# Patient Record
Sex: MALE | Race: WHITE | Employment: FULL TIME | URBAN - METROPOLITAN AREA
[De-identification: names, ages, dates, MRNs, and addresses within clinical notes are randomized per-mention and may not be internally consistent; named-entity substitution may affect disease eponyms.]

---

## 2017-01-10 ENCOUNTER — DOCUMENTATION ONLY (OUTPATIENT)
Dept: LAB | Facility: CLINIC | Age: 42
End: 2017-01-10

## 2017-01-10 DIAGNOSIS — E78.5 HYPERLIPIDEMIA LDL GOAL <100: Primary | ICD-10-CM

## 2017-01-10 DIAGNOSIS — E11.9 TYPE 2 DIABETES MELLITUS WITHOUT COMPLICATION (H): ICD-10-CM

## 2017-01-10 DIAGNOSIS — E78.5 HYPERLIPIDEMIA LDL GOAL <100: ICD-10-CM

## 2017-01-10 LAB
ANION GAP SERPL CALCULATED.3IONS-SCNC: 6 MMOL/L (ref 3–14)
BUN SERPL-MCNC: 19 MG/DL (ref 7–30)
CALCIUM SERPL-MCNC: 9.7 MG/DL (ref 8.5–10.1)
CHLORIDE SERPL-SCNC: 106 MMOL/L (ref 94–109)
CHOLEST SERPL-MCNC: 139 MG/DL
CO2 SERPL-SCNC: 28 MMOL/L (ref 20–32)
CREAT SERPL-MCNC: 0.95 MG/DL (ref 0.66–1.25)
GFR SERPL CREATININE-BSD FRML MDRD: 87 ML/MIN/1.7M2
GLUCOSE SERPL-MCNC: 138 MG/DL (ref 70–99)
HBA1C MFR BLD: 6.8 % (ref 4.3–6)
HDLC SERPL-MCNC: 41 MG/DL
LDLC SERPL CALC-MCNC: 66 MG/DL
NONHDLC SERPL-MCNC: 98 MG/DL
POTASSIUM SERPL-SCNC: 4.6 MMOL/L (ref 3.4–5.3)
SODIUM SERPL-SCNC: 140 MMOL/L (ref 133–144)
TRIGL SERPL-MCNC: 162 MG/DL

## 2017-01-10 PROCEDURE — 80061 LIPID PANEL: CPT | Performed by: INTERNAL MEDICINE

## 2017-01-10 PROCEDURE — 83036 HEMOGLOBIN GLYCOSYLATED A1C: CPT | Performed by: INTERNAL MEDICINE

## 2017-01-10 PROCEDURE — 36415 COLL VENOUS BLD VENIPUNCTURE: CPT | Performed by: INTERNAL MEDICINE

## 2017-01-10 PROCEDURE — 80048 BASIC METABOLIC PNL TOTAL CA: CPT | Performed by: INTERNAL MEDICINE

## 2017-01-10 NOTE — PROGRESS NOTES
Please place or confirm orders for upcoming lab appointment on 01/10/17. Thank you.    PATIENT WOULD LIKE TO GET BLIPS DONE TODAY AS WELL, DUE TO MEDICATION REFILLS. DOESN'T WANT TO COME BACK, PATIENT IS FASTING. THANK YOU.

## 2017-01-13 ENCOUNTER — MYC MEDICAL ADVICE (OUTPATIENT)
Dept: INTERNAL MEDICINE | Facility: CLINIC | Age: 42
End: 2017-01-13

## 2017-01-18 ENCOUNTER — TELEPHONE (OUTPATIENT)
Dept: INTERNAL MEDICINE | Facility: CLINIC | Age: 42
End: 2017-01-18

## 2017-02-06 ENCOUNTER — MYC MEDICAL ADVICE (OUTPATIENT)
Dept: INTERNAL MEDICINE | Facility: CLINIC | Age: 42
End: 2017-02-06

## 2017-02-06 DIAGNOSIS — F40.243 FEAR OF FLYING: Primary | ICD-10-CM

## 2017-02-07 RX ORDER — ALPRAZOLAM 0.25 MG
TABLET ORAL
Qty: 12 TABLET | Refills: 0
Start: 2017-02-07 | End: 2017-11-03

## 2017-02-07 NOTE — TELEPHONE ENCOUNTER
Sent a message to patient via [employer] informing him that the RX was called over to the pharmacy he requested.

## 2017-04-10 DIAGNOSIS — L30.9 DERMATITIS: ICD-10-CM

## 2017-04-11 RX ORDER — TRIAMCINOLONE ACETONIDE 1 MG/G
CREAM TOPICAL
Qty: 80 G | Refills: 1 | Status: SHIPPED | OUTPATIENT
Start: 2017-04-11 | End: 2017-07-10

## 2017-04-11 NOTE — TELEPHONE ENCOUNTER
triamcinolone (KENALOG) 0.1 % cream      Last Written Prescription Date: 12/02/2016  Last Fill Quantity: 45g,  # refills: 1   Last Office Visit with [facility], [facility] or [facility] prescribing provider: 12/02/2016

## 2017-07-10 ENCOUNTER — OFFICE VISIT (OUTPATIENT)
Dept: INTERNAL MEDICINE | Facility: CLINIC | Age: 42
End: 2017-07-10
Payer: COMMERCIAL

## 2017-07-10 VITALS
WEIGHT: 291.6 LBS | HEIGHT: 72 IN | OXYGEN SATURATION: 96 % | TEMPERATURE: 98 F | HEART RATE: 96 BPM | SYSTOLIC BLOOD PRESSURE: 130 MMHG | BODY MASS INDEX: 39.5 KG/M2 | DIASTOLIC BLOOD PRESSURE: 80 MMHG

## 2017-07-10 DIAGNOSIS — L30.9 DERMATITIS: ICD-10-CM

## 2017-07-10 DIAGNOSIS — F40.243 FEAR OF FLYING: ICD-10-CM

## 2017-07-10 DIAGNOSIS — B07.9 VIRAL WARTS, UNSPECIFIED TYPE: ICD-10-CM

## 2017-07-10 DIAGNOSIS — Z87.898 H/O MOTION SICKNESS: ICD-10-CM

## 2017-07-10 DIAGNOSIS — H10.019: Primary | ICD-10-CM

## 2017-07-10 PROCEDURE — 99214 OFFICE O/P EST MOD 30 MIN: CPT | Performed by: INTERNAL MEDICINE

## 2017-07-10 RX ORDER — POLYMYXIN B SULFATE AND TRIMETHOPRIM 1; 10000 MG/ML; [USP'U]/ML
1 SOLUTION OPHTHALMIC
COMMUNITY
End: 2017-07-10

## 2017-07-10 RX ORDER — TRIAMCINOLONE ACETONIDE 1 MG/G
CREAM TOPICAL EVERY MORNING
Qty: 80 G | Refills: 11 | Status: SHIPPED | OUTPATIENT
Start: 2017-07-10 | End: 2018-12-05

## 2017-07-10 RX ORDER — ALPRAZOLAM 0.5 MG
TABLET ORAL
Qty: 20 TABLET | Refills: 0 | Status: SHIPPED | OUTPATIENT
Start: 2017-07-10 | End: 2018-12-03

## 2017-07-10 RX ORDER — POLYMYXIN B SULFATE AND TRIMETHOPRIM 1; 10000 MG/ML; [USP'U]/ML
1 SOLUTION OPHTHALMIC
Qty: 1 BOTTLE | Refills: 11 | Status: SHIPPED | OUTPATIENT
Start: 2017-07-10 | End: 2018-09-24

## 2017-07-10 RX ORDER — SCOLOPAMINE TRANSDERMAL SYSTEM 1 MG/1
PATCH, EXTENDED RELEASE TRANSDERMAL
Qty: 12 PATCH | Refills: 0 | Status: SHIPPED | OUTPATIENT
Start: 2017-07-10 | End: 2018-12-03

## 2017-07-10 NOTE — MR AVS SNAPSHOT
After Visit Summary   7/10/2017    Jason E Behary    MRN: [id number]           Patient Information     Date Of Birth          1975        Visit Information        Provider Department      7/10/2017 4:30 PM Evangelista Saldana MD [facility]        Today's Diagnoses     Acute follicular conjunctivitis, unspecified laterality    -  1    Dermatitis        Fear of flying        H/O motion sickness        Viral warts, unspecified type           Follow-ups after your visit        Who to contact     If you have questions or need follow up information about today's clinic visit or your schedule please contact [facility] directly at [phone].  Normal or non-critical lab and imaging results will be communicated to you by MyChart, letter or phone within 4 business days after the clinic has received the results. If you do not hear from us within 7 days, please contact the clinic through [employer] or phone. If you have a critical or abnormal lab result, we will notify you by phone as soon as possible.  Submit refill requests through [employer] or call your pharmacy and they will forward the refill request to us. Please allow 3 business days for your refill to be completed.          Additional Information About Your Visit        MyChart Information     [employer] gives you secure access to your electronic health record. If you see a primary care provider, you can also send messages to your care team and make appointments. If you have questions, please call your primary care clinic.  If you do not have a primary care provider, please call [phone] and they will assist you.        Care EveryWhere ID     This is your Care EveryWhere ID. This could be used by other organizations to access your [place] medical records  [id number]        Your Vitals Were     Pulse Temperature Height Pulse Oximetry BMI (Body Mass Index)       96 98  F (36.7  C) (Oral) 6' (1.829 m)  96% 39.55 kg/m2        Blood Pressure from Last 3 Encounters:   07/10/17 130/80   12/02/16 124/76   09/21/16 124/74    Weight from Last 3 Encounters:   07/10/17 291 lb 9.6 oz (132.3 kg)   12/02/16 286 lb 1 oz (129.8 kg)   09/21/16 283 lb (128.4 kg)              Today, you had the following     No orders found for display         Today's Medication Changes          These changes are accurate as of: 7/10/17 11:59 PM.  If you have any questions, ask your nurse or doctor.               Start taking these medicines.        Dose/Directions    scopolamine 72 hr patch   Commonly known as:  TRANSDERM   Used for:  H/O motion sickness   Started by:  Evangelista Saldana MD        Apply 1 patch to hairless area behind one ear at least 4 hours before travel.  Remove old patch and change every 3 days (72 hours).   Quantity:  12 patch   Refills:  0         These medicines have changed or have updated prescriptions.        Dose/Directions    * ALPRAZolam 0.25 MG tablet   Commonly known as:  XANAX   This may have changed:    - how much to take  - additional instructions   Used for:  Fear of flying   Changed by:  Evangelista Saldana MD        Take one tab one hour prior to flying.   Quantity:  12 tablet   Refills:  0       * ALPRAZolam 0.5 MG tablet   Commonly known as:  XANAX   This may have changed:  You were already taking a medication with the same name, and this prescription was added. Make sure you understand how and when to take each.   Used for:  Fear of flying   Changed by:  Evangelista Saldana MD        Daily, 30 minutes prior to travel by plane   Quantity:  20 tablet   Refills:  0       triamcinolone 0.1 % cream   Commonly known as:  KENALOG   This may have changed:  See the new instructions.   Used for:  Dermatitis   Changed by:  Evangelista Saldana MD        Apply topically every morning   Quantity:  80 g   Refills:  11       trimethoprim-polymyxin b ophthalmic solution   Commonly known as:  POLYTRIM   This may have  changed:  how to take this   Used for:  Acute follicular conjunctivitis, unspecified laterality   Changed by:  Evangelista Saldana MD        Dose:  1 drop   Place 1 drop into both eyes every 3 hours   Quantity:  1 Bottle   Refills:  11       * Notice:  This list has 2 medication(s) that are the same as other medications prescribed for you. Read the directions carefully, and ask your doctor or other care provider to review them with you.         Where to get your medicines      These medications were sent to [facility]/pharmacy [address] [place], [place] - [address]  [address], [place] [address]     Phone:  [phone]     scopolamine 72 hr patch    triamcinolone 0.1 % cream    trimethoprim-polymyxin b ophthalmic solution         Some of these will need a paper prescription and others can be bought over the counter.  Ask your nurse if you have questions.     Bring a paper prescription for each of these medications     ALPRAZolam 0.5 MG tablet                Primary Care Provider Office Phone # Fax #    Evangelista Saldana [phone]       [facility] [address] [address]        Equal Access to Services     KELSY MALDONADO AH: Hadii aad ku hadasho Soomaali, waaxda luqadaha, qaybta kaalmada adeegyada, sarah tim hayandre casiano . So [facility] [phone].    ATENCIÓN: Si habla español, tiene a pena disposición servicios gratuitos de asistencia lingüística. [facility] [phone].    We comply with applicable federal civil rights laws and [place] laws. We do not discriminate on the basis of race, color, national origin, age, disability sex, sexual orientation or gender identity.            Thank you!     Thank you for choosing [facility]  for your care. Our goal is always to provide you with excellent care. Hearing back from our patients is one way we can continue to improve our services. Please take a few minutes to complete  the written survey that you may receive in the mail after your visit with us. Thank you!             Your Updated Medication List - Protect others around you: Learn how to safely use, store and throw away your medicines at www.disposemymeds.org.          This list is accurate as of: 7/10/17 11:59 PM.  Always use your most recent med list.                   Brand Name Dispense Instructions for use Diagnosis    * ALPRAZolam 0.25 MG tablet    XANAX    12 tablet    Take one tab one hour prior to flying.    Fear of flying       * ALPRAZolam 0.5 MG tablet    XANAX    20 tablet    Daily, 30 minutes prior to travel by plane    Fear of flying       amLODIPine 10 MG tablet    NORVASC    90 tablet    Take 1 tablet (10 mg) by mouth daily    Essential hypertension, benign       * atorvastatin 10 MG tablet    LIPITOR    90 tablet    Take 1 tablet (10 mg) by mouth daily    Mixed hyperlipidemia       * atorvastatin 20 MG tablet    LIPITOR    90 tablet    Take 1 tablet (20 mg) by mouth daily    Mixed hyperlipidemia       cetirizine 10 MG tablet    zyrTEC    90 tablet    Take 1 tablet (10 mg) by mouth every evening    Allergic rhinitis, cause unspecified       glimepiride 2 MG tablet    AMARYL    90 tablet    Take 1 tablet (2 mg) by mouth every morning (before breakfast)    Type 2 diabetes mellitus without complication, without long-term current use of insulin (H)       hydrochlorothiazide 25 MG tablet    HYDRODIURIL    45 tablet    Take 0.5 tablets (12.5 mg) by mouth daily    Essential hypertension, benign       Hydrocodone-Acetaminophen 5-300 MG Tabs           losartan 100 MG tablet    COZAAR    90 tablet    Take 1 tablet (100 mg) by mouth daily    Essential hypertension, benign       metFORMIN 1000 MG tablet    GLUCOPHAGE    180 tablet    TAKE 1 TABLET (1,000 MG) BY MOUTH 2 TIMES DAILY (WITH MEALS)    Type 2 diabetes mellitus without complication (H)       scopolamine 72 hr patch    TRANSDERM    12 patch    Apply 1 patch to  hairless area behind one ear at least 4 hours before travel.  Remove old patch and change every 3 days (72 hours).    H/O motion sickness       triamcinolone 0.1 % cream    KENALOG    80 g    Apply topically every morning    Dermatitis       trimethoprim-polymyxin b ophthalmic solution    POLYTRIM    1 Bottle    Place 1 drop into both eyes every 3 hours    Acute follicular conjunctivitis, unspecified laterality       * Notice:  This list has 4 medication(s) that are the same as other medications prescribed for you. Read the directions carefully, and ask your doctor or other care provider to review them with you.

## 2017-07-10 NOTE — PROGRESS NOTES
SUBJECTIVE:                                                    Jason E Behary is a 42 year old male who presents to clinic today for the following health issues:      WART(S)      Onset: 2 months    Description (location/number): Left hand, index finger    Accompanying signs and symptoms: Painful: YES    History: prior warts: YES    Therapies tried and outcome: Compound W freeze    Other concerns:  1. Requesting medications: Kenalog cream for facial rash, uses every morning. Scopolamine patch for motion sickness and cruises and Xanax (using 2, 0.25 tablets prior to flying)           Problem list and histories reviewed & adjusted, as indicated.  Additional history: as documented        Reviewed and updated as needed this visit by clinical staff  Tobacco  Allergies  Med Hx  Surg Hx  Fam Hx  Soc Hx      Reviewed and updated as needed this visit by Provider         ROS:  Constitutional, HEENT, cardiovascular, pulmonary, gi and gu systems are negative, except as otherwise noted.      OBJECTIVE:   /80  Pulse 96  Temp 98  F (36.7  C) (Oral)  Ht 6' (1.829 m)  Wt 291 lb 9.6 oz (132.3 kg)  SpO2 96%  BMI 39.55 kg/m2  Body mass index is 39.55 kg/(m^2).  GENERAL: healthy, alert and no distress  NECK: no adenopathy, no asymmetry, masses, or scars and thyroid normal to palpation  RESP: lungs clear to auscultation - no rales, rhonchi or wheezes  CV: regular rate and rhythm, normal S1 S2, no S3 or S4, no murmur, click or rub, no peripheral edema and peripheral pulses strong  ABDOMEN: soft, nontender, no hepatosplenomegaly, no masses and bowel sounds normal  MS: no gross musculoskeletal defects noted, no edema  SKIN: Two very small warts, left lateral index finger        ASSESSMENT/PLAN:     1. Dermatitis    - triamcinolone (KENALOG) 0.1 % cream; Apply topically every morning  Dispense: 80 g; Refill: 11    2. Fear of flying    - ALPRAZolam (XANAX) 0.5 MG tablet; Daily, 30 minutes prior to travel by plane  Dispense:  20 tablet; Refill: 0    3. H/O motion sickness    - scopolamine (TRANSDERM) 72 hr patch; Apply 1 patch to hairless area behind one ear at least 4 hours before travel.  Remove old patch and change every 3 days (72 hours).  Dispense: 12 patch; Refill: 0    4. Acute follicular conjunctivitis, unspecified laterality    - trimethoprim-polymyxin b (POLYTRIM) ophthalmic solution; Place 1 drop into both eyes every 3 hours  Dispense: 1 Bottle; Refill: 11    5. Viral warts, unspecified type  Treated with liquid nitrogen spray      Evangelista Saldana MD  [facility]

## 2017-07-10 NOTE — NURSING NOTE
Chief Complaint   Patient presents with     Medication Request     Wart       Initial /80  Pulse 96  Temp 98  F (36.7  C) (Oral)  Ht 6' (1.829 m)  Wt 291 lb 9.6 oz (132.3 kg)  SpO2 96%  BMI 39.55 kg/m2 Estimated body mass index is 39.55 kg/(m^2) as calculated from the following:    Height as of this encounter: 6' (1.829 m).    Weight as of this encounter: 291 lb 9.6 oz (132.3 kg).  Medication Reconciliation: complete   Rebeca Lynn, CMA

## 2017-09-19 DIAGNOSIS — E11.9 TYPE 2 DIABETES MELLITUS WITHOUT COMPLICATION (H): ICD-10-CM

## 2017-09-19 DIAGNOSIS — I10 ESSENTIAL HYPERTENSION, BENIGN: ICD-10-CM

## 2017-09-19 RX ORDER — LOSARTAN POTASSIUM 100 MG/1
TABLET ORAL
Qty: 90 TABLET | Refills: 0 | Status: SHIPPED | OUTPATIENT
Start: 2017-09-19 | End: 2017-11-03

## 2017-09-19 NOTE — TELEPHONE ENCOUNTER
metformin         Last Written Prescription Date: 09/21/16  Last Fill Quantity: 180, # refills: 3  Last Office Visit with [facility], [facility] or [facility] prescribing provider:  07/10/17        BP Readings from Last 3 Encounters:   07/10/17 130/80   12/02/16 124/76   09/21/16 124/74     No results found for: MICROL  Lab Results   Component Value Date    UMALCR  03/12/2016     Creatinine   Date Value Ref Range Status   01/10/2017 0.95 0.66 - 1.25 mg/dL Final   ]  GFR Estimate   Date Value Ref Range Status   01/10/2017 87 >60 mL/min/1.7m2 Final     Comment:     Non  GFR Calc   09/21/2016 >90  Non  GFR Calc   >60 mL/min/1.7m2 Final   03/12/2016 80 > OR = 60 mL/min/1.73m2 Final     GFR Estimate If Black   Date Value Ref Range Status   01/10/2017 >90   GFR Calc   >60 mL/min/1.7m2 Final   09/21/2016 >90   GFR Calc   >60 mL/min/1.7m2 Final     Lab Results   Component Value Date    CHOL 139 01/10/2017     Lab Results   Component Value Date    HDL 41 01/10/2017     Lab Results   Component Value Date    LDL 66 01/10/2017     Lab Results   Component Value Date    TRIG 162 01/10/2017     Lab Results   Component Value Date    CHOLHDLRATIO 5.0 03/12/2016     Lab Results   Component Value Date    AST 53 03/12/2016     Lab Results   Component Value Date    ALT 66 03/12/2016     Lab Results   Component Value Date    A1C 6.8 01/10/2017    A1C 9.2 09/21/2016    A1C 7.7 03/12/2016    A1C 6.9 09/12/2015    A1C 6.5 02/28/2015     Potassium   Date Value Ref Range Status   01/10/2017 4.6 3.4 - 5.3 mmol/L Final     losartan      Last Written Prescription Date: 09/21/16  Last Fill Quantity: 90, # refills: 3  Last Office Visit with [facility], [facility] or [facility] prescribing provider: 07/10/17       Potassium   Date Value Ref Range Status   01/10/2017 4.6 3.4 - 5.3 mmol/L Final     Creatinine   Date Value Ref Range Status   01/10/2017 0.95 0.66 - 1.25 mg/dL Final     BP Readings from Last 3  Encounters:   07/10/17 130/80   12/02/16 124/76   09/21/16 124/74

## 2017-09-19 NOTE — TELEPHONE ENCOUNTER
Did you want this patient to return this fall again for Diabetes visit or not till January ?Yaneth Lynch RN  Did not see a notation after follow up lab .Yaneth Lynch RN

## 2017-09-21 ENCOUNTER — MYC MEDICAL ADVICE (OUTPATIENT)
Dept: INTERNAL MEDICINE | Facility: CLINIC | Age: 42
End: 2017-09-21

## 2017-09-21 DIAGNOSIS — E11.9 TYPE 2 DIABETES MELLITUS WITHOUT COMPLICATION, WITHOUT LONG-TERM CURRENT USE OF INSULIN (H): Primary | ICD-10-CM

## 2017-09-24 DIAGNOSIS — I10 ESSENTIAL HYPERTENSION, BENIGN: ICD-10-CM

## 2017-09-25 NOTE — TELEPHONE ENCOUNTER
amlodipine     Last Written Prescription Date: 09/21/16  Last Fill Quantity: 90, # refills: 3    Last Office Visit with [facility], [facility] or [facility] prescribing provider:  07/10/17   Future Office Visit:  10/13/17  Next 5 appointments (look out 90 days)     Sep 27, 2017 11:15 AM CDT   [facility]    [address] [id number] [phone]            Oct 13, 2017  4:15 PM CDT   MyChart Long with Evangel[facility]    [address] [id number] [phone]                    BP Readings from Last 3 Encounters:   07/10/17 130/80   12/02/16 124/76   09/21/16 124/74

## 2017-09-26 RX ORDER — AMLODIPINE BESYLATE 10 MG/1
TABLET ORAL
Qty: 90 TABLET | Refills: 2 | Status: SHIPPED | OUTPATIENT
Start: 2017-09-26 | End: 2017-11-03

## 2017-09-27 DIAGNOSIS — E11.9 TYPE 2 DIABETES MELLITUS WITHOUT COMPLICATION, WITHOUT LONG-TERM CURRENT USE OF INSULIN (H): ICD-10-CM

## 2017-09-27 LAB
ANION GAP SERPL CALCULATED.3IONS-SCNC: 9 MMOL/L (ref 3–14)
BUN SERPL-MCNC: 13 MG/DL (ref 7–30)
CALCIUM SERPL-MCNC: 9.5 MG/DL (ref 8.5–10.1)
CHLORIDE SERPL-SCNC: 104 MMOL/L (ref 94–109)
CO2 SERPL-SCNC: 25 MMOL/L (ref 20–32)
CREAT SERPL-MCNC: 0.87 MG/DL (ref 0.66–1.25)
CREAT UR-MCNC: 22 MG/DL
GFR SERPL CREATININE-BSD FRML MDRD: >90 ML/MIN/1.7M2
GLUCOSE SERPL-MCNC: 169 MG/DL (ref 70–99)
HBA1C MFR BLD: 7.7 % (ref 4.3–6)
MICROALBUMIN UR-MCNC: <5 MG/L
MICROALBUMIN/CREAT UR: NORMAL MG/G CR (ref 0–17)
POTASSIUM SERPL-SCNC: 3.6 MMOL/L (ref 3.4–5.3)
SODIUM SERPL-SCNC: 138 MMOL/L (ref 133–144)

## 2017-09-27 PROCEDURE — 82043 UR ALBUMIN QUANTITATIVE: CPT | Performed by: INTERNAL MEDICINE

## 2017-09-27 PROCEDURE — 80048 BASIC METABOLIC PNL TOTAL CA: CPT | Performed by: INTERNAL MEDICINE

## 2017-09-27 PROCEDURE — 83036 HEMOGLOBIN GLYCOSYLATED A1C: CPT | Performed by: INTERNAL MEDICINE

## 2017-09-27 PROCEDURE — 36415 COLL VENOUS BLD VENIPUNCTURE: CPT | Performed by: INTERNAL MEDICINE

## 2017-11-03 ENCOUNTER — OFFICE VISIT (OUTPATIENT)
Dept: INTERNAL MEDICINE | Facility: CLINIC | Age: 42
End: 2017-11-03
Payer: COMMERCIAL

## 2017-11-03 VITALS
BODY MASS INDEX: 40.28 KG/M2 | TEMPERATURE: 97.3 F | OXYGEN SATURATION: 98 % | DIASTOLIC BLOOD PRESSURE: 70 MMHG | HEART RATE: 88 BPM | WEIGHT: 297 LBS | SYSTOLIC BLOOD PRESSURE: 128 MMHG

## 2017-11-03 DIAGNOSIS — I10 ESSENTIAL HYPERTENSION, BENIGN: ICD-10-CM

## 2017-11-03 DIAGNOSIS — E11.9 TYPE 2 DIABETES MELLITUS WITHOUT COMPLICATION, WITHOUT LONG-TERM CURRENT USE OF INSULIN (H): Primary | ICD-10-CM

## 2017-11-03 DIAGNOSIS — E78.2 MIXED HYPERLIPIDEMIA: ICD-10-CM

## 2017-11-03 PROCEDURE — 99213 OFFICE O/P EST LOW 20 MIN: CPT | Performed by: INTERNAL MEDICINE

## 2017-11-03 RX ORDER — ATORVASTATIN CALCIUM 20 MG/1
20 TABLET, FILM COATED ORAL DAILY
Qty: 90 TABLET | Refills: 3 | Status: SHIPPED | OUTPATIENT
Start: 2017-11-03 | End: 2018-11-21

## 2017-11-03 RX ORDER — HYDROCHLOROTHIAZIDE 25 MG/1
12.5 TABLET ORAL DAILY
Qty: 45 TABLET | Refills: 3 | Status: SHIPPED | OUTPATIENT
Start: 2017-11-03 | End: 2018-10-23

## 2017-11-03 RX ORDER — LOSARTAN POTASSIUM 100 MG/1
100 TABLET ORAL DAILY
Qty: 90 TABLET | Refills: 3 | Status: SHIPPED | OUTPATIENT
Start: 2017-11-03 | End: 2018-12-03

## 2017-11-03 RX ORDER — AMLODIPINE BESYLATE 10 MG/1
10 TABLET ORAL DAILY
Qty: 90 TABLET | Refills: 3 | Status: SHIPPED | OUTPATIENT
Start: 2017-11-03 | End: 2018-12-03

## 2017-11-03 NOTE — LETTER
[facility]  [address] [address]  Tel. [phone]  Fax [phone]      November 3, 2017     To whom it may concern:    I am writing this letter on behalf of my patient, Jason E Behary (: 1975).  He was seen in my clinic today in follow-up.    This patient has a history of low back pain with occasional lower extremity radiculopathy, exacerbated by prolonged sitting.  I recommend that he be provided a high-seated chair for use at his desk.     Please contact my office if you have questions or concerns.    Sincerely,        DANIELA Saldana  Department of Internal Medicine  [facility]

## 2017-11-03 NOTE — NURSING NOTE
Chief Complaint   Patient presents with     Diabetes     Hyperlipidemia     Hypertension       Initial /70  Pulse 88  Temp 97.3  F (36.3  C) (Oral)  Wt 297 lb (134.7 kg)  SpO2 98%  BMI 40.28 kg/m2 Estimated body mass index is 40.28 kg/(m^2) as calculated from the following:    Height as of 7/10/17: 6' (1.829 m).    Weight as of this encounter: 297 lb (134.7 kg).  Medication Reconciliation: complete

## 2017-11-03 NOTE — MR AVS SNAPSHOT
After Visit Summary   11/3/2017    Jason E Behary    MRN: [id number]           Patient Information     Date Of Birth          1975        Visit Information        Provider Department      11/3/2017 4:15 PM Evangelista Saldana MD [facility]        Today's Diagnoses     Type 2 diabetes mellitus without complication, without long-term current use of insulin (H)    -  1    Essential hypertension, benign        Mixed hyperlipidemia           Follow-ups after your visit        Who to contact     If you have questions or need follow up information about today's clinic visit or your schedule please contact [facility] directly at [phone].  Normal or non-critical lab and imaging results will be communicated to you by MyChart, letter or phone within 4 business days after the clinic has received the results. If you do not hear from us within 7 days, please contact the clinic through [employer] or phone. If you have a critical or abnormal lab result, we will notify you by phone as soon as possible.  Submit refill requests through [employer] or call your pharmacy and they will forward the refill request to us. Please allow 3 business days for your refill to be completed.          Additional Information About Your Visit        MyChart Information     [employer] gives you secure access to your electronic health record. If you see a primary care provider, you can also send messages to your care team and make appointments. If you have questions, please call your primary care clinic.  If you do not have a primary care provider, please call [phone] and they will assist you.        Care EveryWhere ID     This is your Care EveryWhere ID. This could be used by other organizations to access your [place] medical records  [id number]        Your Vitals Were     Pulse Temperature Pulse Oximetry BMI (Body Mass Index)          88 97.3  F (36.3  C) (Oral) 98% 40.28 kg/m2          Blood Pressure from Last 3 Encounters:   11/03/17 128/70   07/10/17 130/80   12/02/16 124/76    Weight from Last 3 Encounters:   11/03/17 297 lb (134.7 kg)   07/10/17 291 lb 9.6 oz (132.3 kg)   12/02/16 286 lb 1 oz (129.8 kg)              Today, you had the following     No orders found for display         Today's Medication Changes          These changes are accurate as of: 11/3/17 11:59 PM.  If you have any questions, ask your nurse or doctor.               These medicines have changed or have updated prescriptions.        Dose/Directions    amLODIPine 10 MG tablet   Commonly known as:  NORVASC   This may have changed:  See the new instructions.   Used for:  Essential hypertension, benign   Changed by:  Evangelista Saldana MD        Dose:  10 mg   Take 1 tablet (10 mg) by mouth daily   Quantity:  90 tablet   Refills:  3       losartan 100 MG tablet   Commonly known as:  COZAAR   This may have changed:  See the new instructions.   Used for:  Essential hypertension, benign   Changed by:  Evangelista Saldana MD        Dose:  100 mg   Take 1 tablet (100 mg) by mouth daily   Quantity:  90 tablet   Refills:  3       metFORMIN 1000 MG tablet   Commonly known as:  GLUCOPHAGE   This may have changed:  See the new instructions.   Used for:  Type 2 diabetes mellitus without complication, without long-term current use of insulin (H)   Changed by:  Evangelista Saldana MD        Dose:  1000 mg   Take 1 tablet (1,000 mg) by mouth 2 times daily (with meals)   Quantity:  180 tablet   Refills:  3            Where to get your medicines      These medications were sent to [place] [address] [place] [address] [address]     Phone:  [phone]     amLODIPine 10 MG tablet    atorvastatin 20 MG tablet    hydrochlorothiazide 25 MG tablet    losartan 100 MG tablet    metFORMIN 1000 MG tablet                Primary Care Provider Office Phone # Fax #    Evangelista Lauren  MD [place] [phone]       [address] [place] [address]        Equal Access to Services     THOMAS MALDONADO : Hadii aad ku hadbrysonjosafat Frances, blakeda richidelmyha, kylahta kajuan darriuseder, sarah danein hayaamarilyn rizoshai shelton stephenie brar. So [facility] [phone].    ATENCIÓN: Si habla español, tiene a pena disposición servicios gratuitos de asistencia lingüística. Llame al [phone].    We comply with applicable federal civil rights laws and [place] laws. We do not discriminate on the basis of race, color, national origin, age, disability, sex, sexual orientation, or gender identity.            Thank you!     Thank you for choosing [facility]  for your care. Our goal is always to provide you with excellent care. Hearing back from our patients is one way we can continue to improve our services. Please take a few minutes to complete the written survey that you may receive in the mail after your visit with us. Thank you!             Your Updated Medication List - Protect others around you: Learn how to safely use, store and throw away your medicines at www.disposemymeds.org.          This list is accurate as of: 11/3/17 11:59 PM.  Always use your most recent med list.                   Brand Name Dispense Instructions for use Diagnosis    ALPRAZolam 0.5 MG tablet    XANAX    20 tablet    Daily, 30 minutes prior to travel by plane    Fear of flying       amLODIPine 10 MG tablet    NORVASC    90 tablet    Take 1 tablet (10 mg) by mouth daily    Essential hypertension, benign       atorvastatin 20 MG tablet    LIPITOR    90 tablet    Take 1 tablet (20 mg) by mouth daily    Mixed hyperlipidemia       cetirizine 10 MG tablet    zyrTEC    90 tablet    Take 1 tablet (10 mg) by mouth every evening    Allergic rhinitis, cause unspecified       glimepiride 2 MG tablet    AMARYL    90 tablet    Take 1 tablet (2 mg) by mouth every morning (before breakfast)    Type 2 diabetes mellitus without  complication, without long-term current use of insulin (H)       hydrochlorothiazide 25 MG tablet    HYDRODIURIL    45 tablet    Take 0.5 tablets (12.5 mg) by mouth daily    Essential hypertension, benign       losartan 100 MG tablet    COZAAR    90 tablet    Take 1 tablet (100 mg) by mouth daily    Essential hypertension, benign       metFORMIN 1000 MG tablet    GLUCOPHAGE    180 tablet    Take 1 tablet (1,000 mg) by mouth 2 times daily (with meals)    Type 2 diabetes mellitus without complication, without long-term current use of insulin (H)       scopolamine 72 hr patch    TRANSDERM    12 patch    Apply 1 patch to hairless area behind one ear at least 4 hours before travel.  Remove old patch and change every 3 days (72 hours).    H/O motion sickness       triamcinolone 0.1 % cream    KENALOG    80 g    Apply topically every morning    Dermatitis       trimethoprim-polymyxin b ophthalmic solution    POLYTRIM    1 Bottle    Place 1 drop into both eyes every 3 hours    Acute follicular conjunctivitis, unspecified laterality

## 2017-11-03 NOTE — PROGRESS NOTES
SUBJECTIVE:   Jason E Behary is a 42 year old male who presents to clinic today for the following health issues:      Diabetes Follow-up      Patient is checking blood sugars: not at all    Diabetic concerns: None     Symptoms of hypoglycemia (low blood sugar): none     Paresthesias (numbness or burning in feet) or sores: No     Date of last diabetic eye exam: 10/2016    Hyperlipidemia Follow-Up      Rate your low fat/cholesterol diet?: fair    Taking statin? Yes, no muscle pain    Other lipid medications/supplements?:  none    Hypertension Follow-up      Outpatient blood pressures are not being checked.    Low Salt Diet: no added salt        Amount of exercise or physical activity: 2-3 days/week for an average of 30-45 minutes    Problems taking medications regularly: No    Medication side effects: none    Diet: low salt and low fat/cholesterol            Problem list and histories reviewed & adjusted, as indicated.  Additional history:         Reviewed and updated as needed this visit by clinical staffTobacco  Allergies       Reviewed and updated as needed this visit by Provider         ROS:  Constitutional, HEENT, cardiovascular, pulmonary, GI, , musculoskeletal, neuro, skin, endocrine and psych systems are negative, except as otherwise noted.      OBJECTIVE:   /70  Pulse 88  Temp 97.3  F (36.3  C) (Oral)  Wt 297 lb (134.7 kg)  SpO2 98%  BMI 40.28 kg/m2  Body mass index is 40.28 kg/(m^2).  GENERAL: healthy, alert and no distress  NECK: no adenopathy, no asymmetry, masses, or scars and thyroid normal to palpation  RESP: lungs clear to auscultation - no rales, rhonchi or wheezes  CV: regular rate and rhythm, normal S1 S2, no S3 or S4, no murmur, click or rub, no peripheral edema and peripheral pulses strong  ABDOMEN: soft, nontender, no hepatosplenomegaly, no masses and bowel sounds normal  MS: no gross musculoskeletal defects noted, no edema        ASSESSMENT/PLAN:     1. Essential hypertension,  benign  Stable  - losartan (COZAAR) 100 MG tablet; Take 1 tablet (100 mg) by mouth daily  Dispense: 90 tablet; Refill: 3  - amLODIPine (NORVASC) 10 MG tablet; Take 1 tablet (10 mg) by mouth daily  Dispense: 90 tablet; Refill: 3  - hydrochlorothiazide (HYDRODIURIL) 25 MG tablet; Take 0.5 tablets (12.5 mg) by mouth daily  Dispense: 45 tablet; Refill: 3    2. Mixed hyperlipidemia  Stable  - atorvastatin (LIPITOR) 20 MG tablet; Take 1 tablet (20 mg) by mouth daily  Dispense: 90 tablet; Refill: 3    3. Type 2 diabetes mellitus without complication, without long-term current use of insulin (H)  Stable  - metFORMIN (GLUCOPHAGE) 1000 MG tablet; Take 1 tablet (1,000 mg) by mouth 2 times daily (with meals)  Dispense: 180 tablet; Refill: 3        Evangelista Saldana MD  [facility]

## 2017-11-08 DIAGNOSIS — E78.2 MIXED HYPERLIPIDEMIA: ICD-10-CM

## 2017-11-08 RX ORDER — ATORVASTATIN CALCIUM 20 MG/1
TABLET, FILM COATED ORAL
Qty: 90 TABLET | Refills: 3 | Status: SHIPPED | OUTPATIENT
Start: 2017-11-08 | End: 2018-04-12

## 2017-11-15 ENCOUNTER — TRANSFERRED RECORDS (OUTPATIENT)
Dept: HEALTH INFORMATION MANAGEMENT | Facility: CLINIC | Age: 42
End: 2017-11-15

## 2017-11-19 DIAGNOSIS — I10 ESSENTIAL HYPERTENSION, BENIGN: ICD-10-CM

## 2017-11-21 RX ORDER — HYDROCHLOROTHIAZIDE 25 MG/1
TABLET ORAL
Qty: 45 TABLET | Refills: 2 | OUTPATIENT
Start: 2017-11-21

## 2017-12-31 ENCOUNTER — MYC MEDICAL ADVICE (OUTPATIENT)
Dept: INTERNAL MEDICINE | Facility: CLINIC | Age: 42
End: 2017-12-31

## 2017-12-31 DIAGNOSIS — E11.9 TYPE 2 DIABETES MELLITUS WITHOUT COMPLICATION, WITHOUT LONG-TERM CURRENT USE OF INSULIN (H): ICD-10-CM

## 2018-01-02 RX ORDER — GLIMEPIRIDE 2 MG/1
2 TABLET ORAL
Qty: 90 TABLET | Refills: 3 | Status: CANCELLED | OUTPATIENT
Start: 2018-01-02

## 2018-01-02 RX ORDER — GLIMEPIRIDE 2 MG/1
TABLET ORAL
Qty: 90 TABLET | Refills: 0 | Status: SHIPPED | OUTPATIENT
Start: 2018-01-02 | End: 2018-01-02

## 2018-01-02 RX ORDER — GLIMEPIRIDE 2 MG/1
TABLET ORAL
Qty: 90 TABLET | Refills: 0 | Status: SHIPPED | OUTPATIENT
Start: 2018-01-02 | End: 2018-08-13

## 2018-02-20 ENCOUNTER — OFFICE VISIT (OUTPATIENT)
Dept: DERMATOLOGY | Facility: CLINIC | Age: 43
End: 2018-02-20
Payer: COMMERCIAL

## 2018-02-20 VITALS — SYSTOLIC BLOOD PRESSURE: 153 MMHG | HEART RATE: 93 BPM | DIASTOLIC BLOOD PRESSURE: 91 MMHG | OXYGEN SATURATION: 98 %

## 2018-02-20 DIAGNOSIS — L21.9 SEBORRHEIC DERMATITIS: ICD-10-CM

## 2018-02-20 DIAGNOSIS — L70.8 STEROID-INDUCED ACNE: ICD-10-CM

## 2018-02-20 DIAGNOSIS — L82.0 INFLAMED SEBORRHEIC KERATOSIS: Primary | ICD-10-CM

## 2018-02-20 DIAGNOSIS — T38.0X5A STEROID-INDUCED ACNE: ICD-10-CM

## 2018-02-20 PROCEDURE — 99203 OFFICE O/P NEW LOW 30 MIN: CPT | Mod: 25 | Performed by: PHYSICIAN ASSISTANT

## 2018-02-20 PROCEDURE — 17110 DESTRUCTION B9 LES UP TO 14: CPT | Performed by: PHYSICIAN ASSISTANT

## 2018-02-20 RX ORDER — CLINDAMYCIN PHOSPHATE 10 UG/ML
LOTION TOPICAL
Qty: 60 ML | Refills: 2 | Status: SHIPPED | OUTPATIENT
Start: 2018-02-20 | End: 2018-12-05

## 2018-02-20 RX ORDER — KETOCONAZOLE 20 MG/G
CREAM TOPICAL
Qty: 60 G | Refills: 5 | Status: SHIPPED | OUTPATIENT
Start: 2018-02-20 | End: 2018-12-05

## 2018-02-20 NOTE — PATIENT INSTRUCTIONS
Apply clindamycin lotion to eye area twice daily for 1-2 weeks     Apply ketoconazole cream daily as needed for flaky skin     WOUND CARE INSTRUCTIONS   FOR CRYOSURGERY   This area treated with liquid nitrogen will form a blister. You do not need to bandage the area until after the blister forms and breaks (which may be a few days). When the blister breaks, begin daily dressing changes as follows:   1) Clean and dry the area with tap water using clean Q-tip or sterile gauze pad.   2) Apply Polysporin ointment or Bacitracin ointment over entire wound. Do NOT use Neosporin ointment.   3) Cover the wound with a band-aid or sterile non-stick gauze pad and micropore paper tape.   REPEAT THESE INSTRUCTIONS AT LEAST ONCE A DAY UNTIL THE WOUND HAS COMPLETELY HEALED.   It is an old wives tale that a wound heals better when it is exposed to air and allowed to dry out. The wound will heal faster with a better cosmetic result if it is kept moist with ointment and covered with a bandage.   Do not let the wound dry out.   IMPORTANT INFORMATION ON REVERSE SIDE   Supplies Needed:   *Cotton tipped applicators (Q-tips)   *Polysporin ointment or Bacitracin ointment (NOT NEOSPORIN)   *Band-aids, or non stick gauze pads and micropore paper tape   PATIENT INFORMATION   During the healing process you will notice a number of changes. All wounds develop a small halo of redness surrounding the wound. This means healing is occurring. Severe itching with extensive redness usually indicates sensitivity to the ointment or bandage tape used to dress the wound. You should call our office if this develops.   Swelling and/or discoloration around your surgical site is common, particularly when performed around the eye.   All wounds normally drain. The larger the wound the more drainage there will be. After 7-10 days, you will notice the wound beginning to shrink and new skin will begin to grow. The wound is healed when you can see skin has formed  over the entire area. A healed wound has a healthy, shiny look to the surface and is red to dark pink in color to normalize. Wounds may take approximately 4-6 weeks to heal. Larger wounds may take 6-8 weeks. After the wound is healed you may discontinue dressing changes.   You may experience a sensation of tightness as your wound heals. This is normal and will gradually subside.   Your healed wound may be sensitive to temperature changes. This sensitivity improves with time, but if you re having a lot of discomfort, try to avoid temperature extremes.   Patients frequently experience itching after their wound appears to have healed because of the continue healing under the skin. Plain Vaseline will help relieve the itching.

## 2018-02-20 NOTE — PROGRESS NOTES
HPI:   Jason E Behary is a 42 year old male who presents for evaluation of multiple bumps on face  chief complaint  Location: near right eye; below left lower lip; left cheek    Condition present for:  Bumps near right eye - 3 months, developed after sunburn while on vacation; below left lower lip and left cheek - 2 months   Previous treatments include: daily TAC, moisturizer, rubbing alcohol; eye drops for stye    Review Of Systems  Eyes: negative  Ears/Nose/Throat: negative  Respiratory: No shortness of breath, dyspnea on exertion, cough, or hemoptysis  Cardiovascular: negative  Gastrointestinal: negative  Genitourinary: negative  Musculoskeletal: negative  Neurologic: negative  Psychiatric: negative    This document serves as a record of the services and decisions personally performed and made by Irina Boss, MS, PA-C. It was created on her behalf by Earline Maki, a trained medical scribe. The creation of this document is based on the provider's statements to the medical scribe.  Earline Maki 4:25 PM February 20, 2018    PHYSICAL EXAM:      Skin exam performed as follows: Type 2 skin. Mood appropriate  Alert and Oriented X 3. Well developed, well nourished in no distress.  General appearance: Normal  Head including face: Normal  Eyes: conjunctiva and lids: Normal  Mouth: Lips, teeth, gums: Normal  Neck: Normal  Chest-breast/axillae: Normal  Back: Normal  Spleen and liver: Normal  Cardiovascular: Exam of peripheral vascular system by observation for swelling, varicosities, edema: Normal  Genitalia: groin, buttocks: Normal  Extremities: digits/nails (clubbing): Normal  Eccrine and Apocrine glands: Normal  Right upper extremity: Normal  Left upper extremity: Normal  Right lower extremity: Normal  Left lower extremity: Normal  Skin: Scalp and body hair: See below    1. Inflamed keratotic plaques on left cheek x 3  2. Papular eruption around right lateral eye    ASSESSMENT/PLAN:     1. Inflamed  seborrheic keratosis on left cheek x 3. As physically tender cryosurgery performed. Advised on post op care.    Using TAC for presumed Seborrheic dermatitis - clear today. He uses this every AM and has used it for the past 7 months. If he does not use it, he becomes very flaky and dry. advised on chronic, recurrent condition. Discussed that it is a reaction to the normal yeast on the skin.    --Start ketoconazole cream daily as needed   --Can resume TAC if flaring - discussed to use this sparingly for 1-2 weeks at a time only  3.   ? Steroid induced acne vs dermatitis on right lateral eye, not responsive to TAC - advised of diagnosis. He has been using TAC daily for 7 months. Does use an eyedrop occasionally for stye's in the eye but has not used this in over 1 month  --Start clindamycin lotion BID for 1-2 weeks, then PRN   --D/c TAC   --Consider PO prednisone if struggling        Follow-up: PRN  CC:   Scribed By: Earline Maki Medical Scribe    The information in this document, created by the medical scribe for me, accurately reflects the services I personally performed and the decisions made by me. I have reviewed and approved this document for accuracy prior to leaving the patient care area.  February 20, 2018 4:39 PM    Irina Boss MS, PA-C

## 2018-02-20 NOTE — MR AVS SNAPSHOT
After Visit Summary   2/20/2018    Jason E Behary    MRN: [id number]           Patient Information     Date Of Birth          1975        Visit Information        Provider Department      2/20/2018 4:15 PM Irina Boss PA-C [facility]        Today's Diagnoses     Inflamed seborrheic keratosis    -  1    Seborrheic dermatitis        Steroid-induced acne          Care Instructions    Apply clindamycin lotion to eye area twice daily for 1-2 weeks     Apply ketoconazole cream daily as needed for flaky skin     WOUND CARE INSTRUCTIONS   FOR CRYOSURGERY   This area treated with liquid nitrogen will form a blister. You do not need to bandage the area until after the blister forms and breaks (which may be a few days). When the blister breaks, begin daily dressing changes as follows:   1) Clean and dry the area with tap water using clean Q-tip or sterile gauze pad.   2) Apply Polysporin ointment or Bacitracin ointment over entire wound. Do NOT use Neosporin ointment.   3) Cover the wound with a band-aid or sterile non-stick gauze pad and micropore paper tape.   REPEAT THESE INSTRUCTIONS AT LEAST ONCE A DAY UNTIL THE WOUND HAS COMPLETELY HEALED.   It is an old wives tale that a wound heals better when it is exposed to air and allowed to dry out. The wound will heal faster with a better cosmetic result if it is kept moist with ointment and covered with a bandage.   Do not let the wound dry out.   IMPORTANT INFORMATION ON REVERSE SIDE   Supplies Needed:   *Cotton tipped applicators (Q-tips)   *Polysporin ointment or Bacitracin ointment (NOT NEOSPORIN)   *Band-aids, or non stick gauze pads and micropore paper tape   PATIENT INFORMATION   During the healing process you will notice a number of changes. All wounds develop a small halo of redness surrounding the wound. This means healing is occurring. Severe itching with extensive redness usually indicates sensitivity to the ointment  or bandage tape used to dress the wound. You should call our office if this develops.   Swelling and/or discoloration around your surgical site is common, particularly when performed around the eye.   All wounds normally drain. The larger the wound the more drainage there will be. After 7-10 days, you will notice the wound beginning to shrink and new skin will begin to grow. The wound is healed when you can see skin has formed over the entire area. A healed wound has a healthy, shiny look to the surface and is red to dark pink in color to normalize. Wounds may take approximately 4-6 weeks to heal. Larger wounds may take 6-8 weeks. After the wound is healed you may discontinue dressing changes.   You may experience a sensation of tightness as your wound heals. This is normal and will gradually subside.   Your healed wound may be sensitive to temperature changes. This sensitivity improves with time, but if you re having a lot of discomfort, try to avoid temperature extremes.   Patients frequently experience itching after their wound appears to have healed because of the continue healing under the skin. Plain Vaseline will help relieve the itching.                 Follow-ups after your visit        Who to contact     If you have questions or need follow up information about today's clinic visit or your schedule please contact [facility] directly at [phone].  Normal or non-critical lab and imaging results will be communicated to you by [employer], letter or phone within 4 business days after the clinic has received the results. If you do not hear from us within 7 days, please contact the clinic through [employer] or phone. If you have a critical or abnormal lab result, we will notify you by phone as soon as possible.  Submit refill requests through [employer] or call your pharmacy and they will forward the refill request to us. Please allow 3 business days for your refill to be completed.           Additional Information About Your Visit        MyChart Information     [employer] gives you secure access to your electronic health record. If you see a primary care provider, you can also send messages to your care team and make appointments. If you have questions, please call your primary care clinic.  If you do not have a primary care provider, please call [phone] and they will assist you.        Care EveryWhere ID     This is your Care EveryWhere ID. This could be used by other organizations to access your [place] medical records  [id number]        Your Vitals Were     Pulse Pulse Oximetry                93 98%           Blood Pressure from Last 3 Encounters:   02/20/18 (!) 153/91   11/03/17 128/70   07/10/17 130/80    Weight from Last 3 Encounters:   11/03/17 134.7 kg (297 lb)   07/10/17 132.3 kg (291 lb 9.6 oz)   12/02/16 129.8 kg (286 lb 1 oz)              Today, you had the following     No orders found for display       Primary Care Provider Office Phone # Fax #    Evangelista Saldana [phone]       [address] [address]        Equal Access to Services     KELSY MALDONADO AH: Hadii aad ku hadasho Soomaali, waaxda luqadaha, qaybta kaalmada adeegyada, waxay danein hayjohnn adeshai shelton labrie ah. So [facility] [phone].    ATENCIÓN: Si habla español, tiene a pena disposición servicios gratuitos de asistencia lingüística. Marinaame al [phone].    We comply with applicable federal civil rights laws and [place] laws. We do not discriminate on the basis of race, color, national origin, age, disability, sex, sexual orientation, or gender identity.            Thank you!     Thank you for choosing [facility]  for your care. Our goal is always to provide you with excellent care. Hearing back from our patients is one way we can continue to improve our services. Please take a few minutes to complete the written survey that you may receive in the mail after your  visit with us. Thank you!             Your Updated Medication List - Protect others around you: Learn how to safely use, store and throw away your medicines at www.disposemymeds.org.          This list is accurate as of 2/20/18  4:39 PM.  Always use your most recent med list.                   Brand Name Dispense Instructions for use Diagnosis    ALPRAZolam 0.5 MG tablet    XANAX    20 tablet    Daily, 30 minutes prior to travel by plane    Fear of flying       amLODIPine 10 MG tablet    NORVASC    90 tablet    Take 1 tablet (10 mg) by mouth daily    Essential hypertension, benign       * atorvastatin 20 MG tablet    LIPITOR    90 tablet    Take 1 tablet (20 mg) by mouth daily    Mixed hyperlipidemia       * atorvastatin 20 MG tablet    LIPITOR    90 tablet    TAKE 1 TABLET (20 MG) BY MOUTH DAILY. PT WILL CALL WHEN NEEDS    Mixed hyperlipidemia       cetirizine 10 MG tablet    zyrTEC    90 tablet    Take 1 tablet (10 mg) by mouth every evening    Allergic rhinitis, cause unspecified       glimepiride 2 MG tablet    AMARYL    90 tablet    TAKE 1 TABLET (2 MG) BY MOUTH EVERY MORNING (BEFORE BREAKFAST)    Type 2 diabetes mellitus without complication, without long-term current use of insulin (H)       hydrochlorothiazide 25 MG tablet    HYDRODIURIL    45 tablet    Take 0.5 tablets (12.5 mg) by mouth daily    Essential hypertension, benign       losartan 100 MG tablet    COZAAR    90 tablet    Take 1 tablet (100 mg) by mouth daily    Essential hypertension, benign       metFORMIN 1000 MG tablet    GLUCOPHAGE    180 tablet    Take 1 tablet (1,000 mg) by mouth 2 times daily (with meals)    Type 2 diabetes mellitus without complication, without long-term current use of insulin (H)       scopolamine 72 hr patch    TRANSDERM    12 patch    Apply 1 patch to hairless area behind one ear at least 4 hours before travel.  Remove old patch and change every 3 days (72 hours).    H/O motion sickness       triamcinolone 0.1 % cream     KENALOG    80 g    Apply topically every morning    Dermatitis       trimethoprim-polymyxin b ophthalmic solution    POLYTRIM    1 Bottle    Place 1 drop into both eyes every 3 hours    Acute follicular conjunctivitis, unspecified laterality       * Notice:  This list has 2 medication(s) that are the same as other medications prescribed for you. Read the directions carefully, and ask your doctor or other care provider to review them with you.

## 2018-02-20 NOTE — NURSING NOTE
Chief Complaint   Patient presents with     Skin Check     right ey,  bump underneath left lower lip, bump left cheek        Initial BP (!) 153/91  Pulse 93  SpO2 98% Estimated body mass index is 40.28 kg/(m^2) as calculated from the following:    Height as of 7/10/17: 1.829 m (6').    Weight as of 11/3/17: 134.7 kg (297 lb).  Medication Reconciliation: complete

## 2018-02-20 NOTE — LETTER
2/20/2018         RE: Jason E Behary  [address] [facility] [id number]        Dear Colleague,    Thank you for referring your patient, Jason E Behary, to the [facility]. Please see a copy of my visit note below.    HPI:   Jason E Behary is a 42 year old male who presents for evaluation of multiple bumps on face  chief complaint  Location: near right eye; below left lower lip; left cheek    Condition present for:  Bumps near right eye - 3 months, developed after sunburn while on vacation; below left lower lip and left cheek - 2 months   Previous treatments include: daily TAC, moisturizer, rubbing alcohol; eye drops for stye    Review Of Systems  Eyes: negative  Ears/Nose/Throat: negative  Respiratory: No shortness of breath, dyspnea on exertion, cough, or hemoptysis  Cardiovascular: negative  Gastrointestinal: negative  Genitourinary: negative  Musculoskeletal: negative  Neurologic: negative  Psychiatric: negative    This document serves as a record of the services and decisions personally performed and made by Irina Boss, MS, PA-C. It was created on her behalf by Earline Maki, a trained medical scribe. The creation of this document is based on the provider's statements to the medical scribe.  Earline Maki 4:25 PM February 20, 2018    PHYSICAL EXAM:      Skin exam performed as follows: Type 2 skin. Mood appropriate  Alert and Oriented X 3. Well developed, well nourished in no distress.  General appearance: Normal  Head including face: Normal  Eyes: conjunctiva and lids: Normal  Mouth: Lips, teeth, gums: Normal  Neck: Normal  Chest-breast/axillae: Normal  Back: Normal  Spleen and liver: Normal  Cardiovascular: Exam of peripheral vascular system by observation for swelling, varicosities, edema: Normal  Genitalia: groin, buttocks: Normal  Extremities: digits/nails (clubbing): Normal  Eccrine and Apocrine glands: Normal  Right upper extremity: Normal  Left upper  extremity: Normal  Right lower extremity: Normal  Left lower extremity: Normal  Skin: Scalp and body hair: See below    1. Inflamed keratotic plaques on left cheek x 3  2. Papular eruption around right lateral eye    ASSESSMENT/PLAN:     1. Inflamed seborrheic keratosis on left cheek x 3. As physically tender cryosurgery performed. Advised on post op care.    Using TAC for presumed Seborrheic dermatitis - clear today. He uses this every AM and has used it for the past 7 months. If he does not use it, he becomes very flaky and dry. advised on chronic, recurrent condition. Discussed that it is a reaction to the normal yeast on the skin.    --Start ketoconazole cream daily as needed   --Can resume TAC if flaring - discussed to use this sparingly for 1-2 weeks at a time only  3.   ? Steroid induced acne vs dermatitis on right lateral eye, not responsive to TAC - advised of diagnosis. He has been using TAC daily for 7 months. Does use an eyedrop occasionally for stye's in the eye but has not used this in over 1 month  --Start clindamycin lotion BID for 1-2 weeks, then PRN   --D/c TAC   --Consider PO prednisone if struggling        Follow-up: PRN  CC:   Scribed By: Earline Maki Medical Scribe    The information in this document, created by the medical scribe for me, accurately reflects the services I personally performed and the decisions made by me. I have reviewed and approved this document for accuracy prior to leaving the patient care area.  February 20, 2018 4:39 PM    Irina Boss MS, PAARMEN      Again, thank you for allowing me to participate in the care of your patient.        Sincerely,        Irina Boss PA-C

## 2018-03-28 ENCOUNTER — MYC MEDICAL ADVICE (OUTPATIENT)
Dept: INTERNAL MEDICINE | Facility: CLINIC | Age: 43
End: 2018-03-28

## 2018-03-28 DIAGNOSIS — E11.9 TYPE 2 DIABETES MELLITUS WITHOUT COMPLICATION, WITHOUT LONG-TERM CURRENT USE OF INSULIN (H): Primary | ICD-10-CM

## 2018-04-03 DIAGNOSIS — E11.9 TYPE 2 DIABETES MELLITUS WITHOUT COMPLICATION, WITHOUT LONG-TERM CURRENT USE OF INSULIN (H): ICD-10-CM

## 2018-04-03 LAB
ALBUMIN SERPL-MCNC: 4.2 G/DL (ref 3.4–5)
ALP SERPL-CCNC: 82 U/L (ref 40–150)
ALT SERPL W P-5'-P-CCNC: 80 U/L (ref 0–70)
ANION GAP SERPL CALCULATED.3IONS-SCNC: 7 MMOL/L (ref 3–14)
AST SERPL W P-5'-P-CCNC: 76 U/L (ref 0–45)
BILIRUB SERPL-MCNC: 0.5 MG/DL (ref 0.2–1.3)
BUN SERPL-MCNC: 15 MG/DL (ref 7–30)
CALCIUM SERPL-MCNC: 9.2 MG/DL (ref 8.5–10.1)
CHLORIDE SERPL-SCNC: 105 MMOL/L (ref 94–109)
CHOLEST SERPL-MCNC: 140 MG/DL
CO2 SERPL-SCNC: 27 MMOL/L (ref 20–32)
CREAT SERPL-MCNC: 0.88 MG/DL (ref 0.66–1.25)
GFR SERPL CREATININE-BSD FRML MDRD: >90 ML/MIN/1.7M2
GLUCOSE SERPL-MCNC: 268 MG/DL (ref 70–99)
HBA1C MFR BLD: 8.3 % (ref 0–6.4)
HDLC SERPL-MCNC: 26 MG/DL
LDLC SERPL CALC-MCNC: 65 MG/DL
NONHDLC SERPL-MCNC: 114 MG/DL
POTASSIUM SERPL-SCNC: 4.4 MMOL/L (ref 3.4–5.3)
PROT SERPL-MCNC: 7.4 G/DL (ref 6.8–8.8)
SODIUM SERPL-SCNC: 139 MMOL/L (ref 133–144)
TRIGL SERPL-MCNC: 246 MG/DL

## 2018-04-03 PROCEDURE — 83036 HEMOGLOBIN GLYCOSYLATED A1C: CPT | Performed by: INTERNAL MEDICINE

## 2018-04-03 PROCEDURE — 80053 COMPREHEN METABOLIC PANEL: CPT | Performed by: INTERNAL MEDICINE

## 2018-04-03 PROCEDURE — 80061 LIPID PANEL: CPT | Performed by: INTERNAL MEDICINE

## 2018-04-03 PROCEDURE — 36415 COLL VENOUS BLD VENIPUNCTURE: CPT | Performed by: INTERNAL MEDICINE

## 2018-04-12 ENCOUNTER — OFFICE VISIT (OUTPATIENT)
Dept: INTERNAL MEDICINE | Facility: CLINIC | Age: 43
End: 2018-04-12
Payer: COMMERCIAL

## 2018-04-12 VITALS
DIASTOLIC BLOOD PRESSURE: 76 MMHG | TEMPERATURE: 98.2 F | SYSTOLIC BLOOD PRESSURE: 134 MMHG | BODY MASS INDEX: 40.5 KG/M2 | HEIGHT: 72 IN | WEIGHT: 299 LBS | HEART RATE: 97 BPM | OXYGEN SATURATION: 96 %

## 2018-04-12 DIAGNOSIS — L82.0 INFLAMED SEBORRHEIC KERATOSIS: ICD-10-CM

## 2018-04-12 DIAGNOSIS — E11.9 TYPE 2 DIABETES MELLITUS WITHOUT COMPLICATION, WITHOUT LONG-TERM CURRENT USE OF INSULIN (H): Primary | ICD-10-CM

## 2018-04-12 PROCEDURE — 99214 OFFICE O/P EST MOD 30 MIN: CPT | Performed by: INTERNAL MEDICINE

## 2018-04-12 ASSESSMENT — PAIN SCALES - GENERAL: PAINLEVEL: MILD PAIN (2)

## 2018-04-12 NOTE — PROGRESS NOTES
SUBJECTIVE:   Jason E Behary is a 42 year old male who presents to clinic today for the following health issues:      Diabetes Follow-up      Patient is checking blood sugars: not at all    Diabetic concerns: None and blood sugar frequently over 200     Symptoms of hypoglycemia (low blood sugar): none     Paresthesias (numbness or burning in feet) or sores: No     Date of last diabetic eye exam: 10/2017    BP Readings from Last 2 Encounters:   02/20/18 (!) 153/91   11/03/17 128/70     Hemoglobin A1C (%)   Date Value   04/03/2018 8.3 (H)   09/27/2017 7.7 (H)     LDL Cholesterol Calculated (mg/dL)   Date Value   04/03/2018 65   01/10/2017 66       Amount of exercise or physical activity: 2-3 days/week for an average of 45-60 minutes    Problems taking medications regularly: No    Medication side effects: none    Diet: low salt      Bump on face      Duration: 12/2017    Description (location/character/radiation): 1/4 inch in diameter on the Left cheek    Intensity:  mild    Accompanying signs and symptoms: Painful to touch    History (similar episodes/previous evaluation): None    Precipitating or alleviating factors: According to Dermatologist it is age related    Therapies tried and outcome: Liquid Nitrogen and cream ineffective   Hoping to get it to removed today.    Patient also has form he needs completed        Problem list and histories reviewed & adjusted, as indicated.  Additional history:     Glycosylated hemoglobin up to 8.3.  Patient interested in not increasing medicines if possible, feels there is still room to improve his therapeutic lifestyle changes.    The bump on his left cheek is a seborrheic keratosis, which has been frozen multiple times by dermatology.  He is requesting this be done again today.    Reviewed and updated as needed this visit by clinical staff       Reviewed and updated as needed this visit by Provider         ROS:  Constitutional, HEENT, cardiovascular, pulmonary, GI, ,  musculoskeletal, neuro, skin, endocrine and psych systems are negative, except as otherwise noted.    OBJECTIVE:     /76 (BP Location: Left arm, Patient Position: Chair, Cuff Size: Adult Large)  Pulse 97  Temp 98.2  F (36.8  C) (Oral)  Ht 6' (1.829 m)  Wt 299 lb (135.6 kg)  SpO2 96%  BMI 40.55 kg/m2  Body mass index is 40.55 kg/(m^2).  GENERAL: healthy, alert and no distress  NECK: no adenopathy, no asymmetry, masses, or scars and thyroid normal to palpation  RESP: lungs clear to auscultation - no rales, rhonchi or wheezes  CV: regular rate and rhythm, normal S1 S2, no S3 or S4, no murmur, click or rub, no peripheral edema and peripheral pulses strong  ABDOMEN: soft, nontender, no hepatosplenomegaly, no masses and bowel sounds normal  MS: no gross musculoskeletal defects noted, no edema  SKIN: Seborrheic keratosis, left cheek        ASSESSMENT/PLAN:     1. Type 2 diabetes mellitus without complication, without long-term current use of insulin (H)  Suboptimally controlled.  Again encouraged therapeutic lifestyle changes, will continue metformin and glimepiride for now, recheck in 6 months.  - Comprehensive metabolic panel; Future  - Hemoglobin A1c; Future  - TSH with free T4 reflex; Future  - Albumin Random Urine Quantitative with Creat Ratio; Future    2. Inflamed seborrheic keratosis  Treated with cryotherapy, return to clinic as needed.  Suggest next follow-up should be with Derm for definitive therapy.          Evangelista Saldana MD  [facility]

## 2018-04-12 NOTE — MR AVS SNAPSHOT
After Visit Summary   4/12/2018    Jason E Behary    MRN: [id number]           Patient Information     Date Of Birth          1975        Visit Information        Provider Department      4/12/2018 11:15 AM Evangelista Saldana MD [facility]        Today's Diagnoses     Type 2 diabetes mellitus without complication, without long-term current use of insulin (H)    -  1    Inflamed seborrheic keratosis           Follow-ups after your visit        Future tests that were ordered for you today     Open Future Orders        Priority Expected Expires Ordered    Comprehensive metabolic panel Routine 10/9/2018 11/8/2018 4/12/2018    Hemoglobin A1c Routine 10/9/2018 11/8/2018 4/12/2018    TSH with free T4 reflex Routine 10/9/2018 11/8/2018 4/12/2018    Albumin Random Urine Quantitative with Creat Ratio Routine 10/9/2018 11/8/2018 4/12/2018            Who to contact     If you have questions or need follow up information about today's clinic visit or your schedule please contact [facility] directly at [phone].  Normal or non-critical lab and imaging results will be communicated to you by Fits.mehart, letter or phone within 4 business days after the clinic has received the results. If you do not hear from us within 7 days, please contact the clinic through ShopSquad/Ownzat or phone. If you have a critical or abnormal lab result, we will notify you by phone as soon as possible.  Submit refill requests through HouseTrip or call your pharmacy and they will forward the refill request to us. Please allow 3 business days for your refill to be completed.          Additional Information About Your Visit        Fits.mehart Information     HouseTrip gives you secure access to your electronic health record. If you see a primary care provider, you can also send messages to your care team and make appointments. If you have questions, please call your primary care clinic.  If you do not  have a primary care provider, please call [phone] and they will assist you.        Care EveryWhere ID     This is your Care EveryWhere ID. This could be used by other organizations to access your [place] medical records  [id number]        Your Vitals Were     Pulse Temperature Height Pulse Oximetry BMI (Body Mass Index)       97 98.2  F (36.8  C) (Oral) 6' (1.829 m) 96% 40.55 kg/m2        Blood Pressure from Last 3 Encounters:   04/12/18 134/76   02/20/18 (!) 153/91   11/03/17 128/70    Weight from Last 3 Encounters:   04/12/18 299 lb (135.6 kg)   11/03/17 297 lb (134.7 kg)   07/10/17 291 lb 9.6 oz (132.3 kg)               Primary Care Provider Office Phone # Fax #    Evangelista Saldana [phone]       [address] [address]        Equal Access to Services     THOMAS MALDONADO : Hadii aad ku hadasho Soomaali, waaxda luqadaha, qaybta kaalmada adeegyada, waxay idiin hayjohnn rey casiano . So [facility] [phone].    ATENCIÓN: Si habla español, tiene a pena disposición servicios gratuitos de asistencia lingüística. [facility] [phone].    We comply with applicable federal civil rights laws and [place] laws. We do not discriminate on the basis of race, color, national origin, age, disability, sex, sexual orientation, or gender identity.            Thank you!     Thank you for choosing [facility]  for your care. Our goal is always to provide you with excellent care. Hearing back from our patients is one way we can continue to improve our services. Please take a few minutes to complete the written survey that you may receive in the mail after your visit with us. Thank you!             Your Updated Medication List - Protect others around you: Learn how to safely use, store and throw away your medicines at www.disposemymeds.org.          This list is accurate as of 4/12/18  2:26 PM.  Always use your most recent med list.                   Brand Name  Dispense Instructions for use Diagnosis    ALPRAZolam 0.5 MG tablet    XANAX    20 tablet    Daily, 30 minutes prior to travel by plane    Fear of flying       amLODIPine 10 MG tablet    NORVASC    90 tablet    Take 1 tablet (10 mg) by mouth daily    Essential hypertension, benign       atorvastatin 20 MG tablet    LIPITOR    90 tablet    Take 1 tablet (20 mg) by mouth daily    Mixed hyperlipidemia       cetirizine 10 MG tablet    zyrTEC    90 tablet    Take 1 tablet (10 mg) by mouth every evening    Allergic rhinitis, cause unspecified       clindamycin 1 % lotion    CLEOCIN T    60 mL    Apply to AA BID    Steroid-induced acne       glimepiride 2 MG tablet    AMARYL    90 tablet    TAKE 1 TABLET (2 MG) BY MOUTH EVERY MORNING (BEFORE BREAKFAST)    Type 2 diabetes mellitus without complication, without long-term current use of insulin (H)       hydrochlorothiazide 25 MG tablet    HYDRODIURIL    45 tablet    Take 0.5 tablets (12.5 mg) by mouth daily    Essential hypertension, benign       ketoconazole 2 % cream    NIZORAL    60 g    Apply to AA QD PRN    Seborrheic dermatitis       losartan 100 MG tablet    COZAAR    90 tablet    Take 1 tablet (100 mg) by mouth daily    Essential hypertension, benign       metFORMIN 1000 MG tablet    GLUCOPHAGE    180 tablet    Take 1 tablet (1,000 mg) by mouth 2 times daily (with meals)    Type 2 diabetes mellitus without complication, without long-term current use of insulin (H)       scopolamine 72 hr patch    TRANSDERM    12 patch    Apply 1 patch to hairless area behind one ear at least 4 hours before travel.  Remove old patch and change every 3 days (72 hours).    H/O motion sickness       triamcinolone 0.1 % cream    KENALOG    80 g    Apply topically every morning    Dermatitis       trimethoprim-polymyxin b ophthalmic solution    POLYTRIM    1 Bottle    Place 1 drop into both eyes every 3 hours    Acute follicular conjunctivitis, unspecified laterality

## 2018-04-13 ENCOUNTER — TELEPHONE (OUTPATIENT)
Dept: INTERNAL MEDICINE | Facility: CLINIC | Age: 43
End: 2018-04-13

## 2018-04-13 NOTE — TELEPHONE ENCOUNTER
Reason for call:  Form   Our goal is to have forms completed within 72 hours, however some forms may require a visit or additional information.     Who is the form from? Mazin Russo (if other please explain)  Where did the form come from? Patient or family brought in     What clinic location was the form placed at? [facility]  Where was the form placed? Given to PCP  What number is listed as a contact on the form?     Phone call message - patient request for a letter, form or note:     Date needed: as soon as possible  Please fax to [phone]  Has the patient signed a consent form for release of information? YES    Additional comments:     Type of letter, form or note: [employer]    Phone number to reach patient:  Cell number on file:    Telephone Information:   Mobile [phone]       Best Time:      Can we leave a detailed message on this number?  YES

## 2018-05-15 ENCOUNTER — OFFICE VISIT (OUTPATIENT)
Dept: DERMATOLOGY | Facility: CLINIC | Age: 43
End: 2018-05-15
Payer: COMMERCIAL

## 2018-05-15 VITALS — DIASTOLIC BLOOD PRESSURE: 79 MMHG | HEART RATE: 84 BPM | SYSTOLIC BLOOD PRESSURE: 131 MMHG | OXYGEN SATURATION: 98 %

## 2018-05-15 DIAGNOSIS — H01.9 DERMATITIS OF EYELIDS OF BOTH EYES: ICD-10-CM

## 2018-05-15 DIAGNOSIS — L82.0 INFLAMED SEBORRHEIC KERATOSIS: Primary | ICD-10-CM

## 2018-05-15 PROCEDURE — 17110 DESTRUCTION B9 LES UP TO 14: CPT | Performed by: PHYSICIAN ASSISTANT

## 2018-05-15 PROCEDURE — 99213 OFFICE O/P EST LOW 20 MIN: CPT | Mod: 25 | Performed by: PHYSICIAN ASSISTANT

## 2018-05-15 RX ORDER — TACROLIMUS 1 MG/G
OINTMENT TOPICAL 2 TIMES DAILY
Qty: 60 G | Refills: 3 | Status: SHIPPED | OUTPATIENT
Start: 2018-05-15 | End: 2018-12-05

## 2018-05-15 NOTE — MR AVS SNAPSHOT
After Visit Summary   5/15/2018    Jason E Behary    MRN: [id number]           Patient Information     Date Of Birth          1975        Visit Information        Provider Department      5/15/2018 4:30 PM Irina Boss PA-C [facility]        Today's Diagnoses     Inflamed seborrheic keratosis    -  1    Dermatitis of eyelids of both eyes          Care Instructions    WOUND CARE INSTRUCTIONS   FOR CRYOSURGERY   This area treated with liquid nitrogen will form a blister. You do not need to bandage the area until after the blister forms and breaks (which may be a few days). When the blister breaks, begin daily dressing changes as follows:   1) Clean and dry the area with tap water using clean Q-tip or sterile gauze pad.   2) Apply Polysporin ointment or Bacitracin ointment over entire wound. Do NOT use Neosporin ointment.   3) Cover the wound with a band-aid or sterile non-stick gauze pad and micropore paper tape.   REPEAT THESE INSTRUCTIONS AT LEAST ONCE A DAY UNTIL THE WOUND HAS COMPLETELY HEALED.   It is an old wives tale that a wound heals better when it is exposed to air and allowed to dry out. The wound will heal faster with a better cosmetic result if it is kept moist with ointment and covered with a bandage.   Do not let the wound dry out.   IMPORTANT INFORMATION ON REVERSE SIDE   Supplies Needed:   *Cotton tipped applicators (Q-tips)   *Polysporin ointment or Bacitracin ointment (NOT NEOSPORIN)   *Band-aids, or non stick gauze pads and micropore paper tape   PATIENT INFORMATION   During the healing process you will notice a number of changes. All wounds develop a small halo of redness surrounding the wound. This means healing is occurring. Severe itching with extensive redness usually indicates sensitivity to the ointment or bandage tape used to dress the wound. You should call our office if this develops.   Swelling and/or discoloration around your surgical  site is common, particularly when performed around the eye.   All wounds normally drain. The larger the wound the more drainage there will be. After 7-10 days, you will notice the wound beginning to shrink and new skin will begin to grow. The wound is healed when you can see skin has formed over the entire area. A healed wound has a healthy, shiny look to the surface and is red to dark pink in color to normalize. Wounds may take approximately 4-6 weeks to heal. Larger wounds may take 6-8 weeks. After the wound is healed you may discontinue dressing changes.   You may experience a sensation of tightness as your wound heals. This is normal and will gradually subside.   Your healed wound may be sensitive to temperature changes. This sensitivity improves with time, but if you re having a lot of discomfort, try to avoid temperature extremes.   Patients frequently experience itching after their wound appears to have healed because of the continue healing under the skin. Plain Vaseline will help relieve the itching.                 Follow-ups after your visit        Your next 10 appointments already scheduled     Jun 12, 2018  4:30 PM CDT   Return Visit with Irina Boss PA-C   [facility]    [address] [place] [address] [phone]              Who to contact     If you have questions or need follow up information about today's clinic visit or your schedule please contact [facility] directly at [phone].  Normal or non-critical lab and imaging results will be communicated to you by MyChart, letter or phone within 4 business days after the clinic has received the results. If you do not hear from us within 7 days, please contact the clinic through MyChart or phone. If you have a critical or abnormal lab result, we will notify you by phone as soon as possible.  Submit refill requests through InStream Mediat or call  your pharmacy and they will forward the refill request to us. Please allow 3 business days for your refill to be completed.          Additional Information About Your Visit        MyChart Information     Sun Numberhart gives you secure access to your electronic health record. If you see a primary care provider, you can also send messages to your care team and make appointments. If you have questions, please call your primary care clinic.  If you do not have a primary care provider, please call [phone] and they will assist you.        Care EveryWhere ID     This is your Care EveryWhere ID. This could be used by other organizations to access your [place] medical records  [id number]        Your Vitals Were     Pulse Pulse Oximetry                84 98%           Blood Pressure from Last 3 Encounters:   05/15/18 131/79   04/12/18 134/76   02/20/18 (!) 153/91    Weight from Last 3 Encounters:   04/12/18 135.6 kg (299 lb)   11/03/17 134.7 kg (297 lb)   07/10/17 132.3 kg (291 lb 9.6 oz)              We Performed the Following     DESTRUCT BENIGN LESION, UP TO 14          Today's Medication Changes          These changes are accurate as of 5/15/18  6:20 PM.  If you have any questions, ask your nurse or doctor.               Start taking these medicines.        Dose/Directions    tacrolimus 0.1 % ointment   Commonly known as:  PROTOPIC   Used for:  Dermatitis of eyelids of both eyes   Started by:  Irina Boss PA-C        Apply topically 2 times daily   Quantity:  60 g   Refills:  3            Where to get your medicines      These medications were sent to [place] [address] [place] [address] [address]     Phone:  [phone]     tacrolimus 0.1 % ointment                Primary Care Provider Office Phone # Fax #    Evangelista Saldana [phone]       [address] [place] [id number]        Equal Access to Services     THOMAS MALDONADO  AH: Hadii shahzad noebrysono Sogrzegorzali, waaxda luqadaha, qaybta kaalmada adeeder, sarah danein hayaamarilyn rizoshai shelton laHarjeetaamarilyn brar. So [facility] [phone].    ATENCIÓN: Si habla coleen, tiene a pena disposición servicios gratuitos de asistencia lingüística. Llame al [phone].    We comply with applicable federal civil rights laws and [place] laws. We do not discriminate on the basis of race, color, national origin, age, disability, sex, sexual orientation, or gender identity.            Thank you!     Thank you for choosing [facility]  for your care. Our goal is always to provide you with excellent care. Hearing back from our patients is one way we can continue to improve our services. Please take a few minutes to complete the written survey that you may receive in the mail after your visit with us. Thank you!             Your Updated Medication List - Protect others around you: Learn how to safely use, store and throw away your medicines at www.disposemymeds.org.          This list is accurate as of 5/15/18  6:20 PM.  Always use your most recent med list.                   Brand Name Dispense Instructions for use Diagnosis    ALPRAZolam 0.5 MG tablet    XANAX    20 tablet    Daily, 30 minutes prior to travel by plane    Fear of flying       amLODIPine 10 MG tablet    NORVASC    90 tablet    Take 1 tablet (10 mg) by mouth daily    Essential hypertension, benign       atorvastatin 20 MG tablet    LIPITOR    90 tablet    Take 1 tablet (20 mg) by mouth daily    Mixed hyperlipidemia       cetirizine 10 MG tablet    zyrTEC    90 tablet    Take 1 tablet (10 mg) by mouth every evening    Allergic rhinitis, cause unspecified       clindamycin 1 % lotion    CLEOCIN T    60 mL    Apply to AA BID    Steroid-induced acne       glimepiride 2 MG tablet    AMARYL    90 tablet    TAKE 1 TABLET (2 MG) BY MOUTH EVERY MORNING (BEFORE BREAKFAST)    Type 2 diabetes mellitus without complication, without long-term  current use of insulin (H)       hydrochlorothiazide 25 MG tablet    HYDRODIURIL    45 tablet    Take 0.5 tablets (12.5 mg) by mouth daily    Essential hypertension, benign       ketoconazole 2 % cream    NIZORAL    60 g    Apply to AA QD PRN    Seborrheic dermatitis       losartan 100 MG tablet    COZAAR    90 tablet    Take 1 tablet (100 mg) by mouth daily    Essential hypertension, benign       metFORMIN 1000 MG tablet    GLUCOPHAGE    180 tablet    Take 1 tablet (1,000 mg) by mouth 2 times daily (with meals)    Type 2 diabetes mellitus without complication, without long-term current use of insulin (H)       scopolamine 72 hr patch    TRANSDERM    12 patch    Apply 1 patch to hairless area behind one ear at least 4 hours before travel.  Remove old patch and change every 3 days (72 hours).    H/O motion sickness       tacrolimus 0.1 % ointment    PROTOPIC    60 g    Apply topically 2 times daily    Dermatitis of eyelids of both eyes       triamcinolone 0.1 % cream    KENALOG    80 g    Apply topically every morning    Dermatitis       trimethoprim-polymyxin b ophthalmic solution    POLYTRIM    1 Bottle    Place 1 drop into both eyes every 3 hours    Acute follicular conjunctivitis, unspecified laterality

## 2018-05-15 NOTE — LETTER
5/15/2018         RE: Jason E Behary  [address] [facility] [address]        Dear Colleague,    Thank you for referring your patient, Jason E Behary, to the [facility]. Please see a copy of my visit note below.    HPI:   Jason E Behary is a 42 year old male who presents for follow up of multiple bumps on face  chief complaint  Location: near right eye; below left lower lip; left cheek    Condition present for:  Bumps near right eye - 5 months, developed after sunburn while on vacation; below left lower lip and left cheek - 2 months   Previous treatments include: daily TAC, moisturizer, rubbing alcohol; eye drops for stye,ketoconazole    Review Of Systems  Eyes: negative  Ears/Nose/Throat: negative  Respiratory: No shortness of breath, dyspnea on exertion, cough, or hemoptysis  Cardiovascular: negative  Gastrointestinal: negative  Genitourinary: negative  Musculoskeletal: negative  Neurologic: negative  Psychiatric: negative    This document serves as a record of the services and decisions personally performed and made by Irina Boss, MS, PA-C. It was created on her behalf by Shilpi Streeter, a trained medical scribe. The creation of this document is based on the provider's statements to the medical scribe.  Shilpi Streeter 4:37 PM May 15, 2018    PHYSICAL EXAM:    /79  Pulse 84  SpO2 98%  Skin exam performed as follows: Type 2 skin. Mood appropriate  Alert and Oriented X 3. Well developed, well nourished in no distress.  General appearance: Normal  Head including face: Normal  Eyes: conjunctiva and lids: Normal  Mouth: Lips, teeth, gums: Normal  Neck: Normal  Chest-breast/axillae: Normal  Back: Normal  Spleen and liver: Normal  Cardiovascular: Exam of peripheral vascular system by observation for swelling, varicosities, edema: Normal  Genitalia: groin, buttocks: Normal  Extremities: digits/nails (clubbing): Normal  Eccrine and Apocrine glands:  Normal  Right upper extremity: Normal  Left upper extremity: Normal  Right lower extremity: Normal  Left lower extremity: Normal  Skin: Scalp and body hair: See below    1. Inflamed keratotic papules on left cheek x 3    ASSESSMENT/PLAN:     1. Inflamed seborrheic keratosis on left cheek x 3 (2nd treatment will biopsy at next OV if not responsive). As physically tender cryosurgery performed. Advised on post op care.    Using TAC on the face presumed Seborrheic dermatitis. He uses this every AM and has used it for the past 7 months. Still using this; clindamycin lotion did not help. If he does not use it, he becomes very flaky and dry. advised on chronic, recurrent condition. More suspect eyelid dermatitis. Advised to d/c steroid; discussed risks of skin atrophy and increased IOP with steroids.    --Start tacrolimus ointment BID PRN              Follow-up: 1 month  CC:   Scribed By:Shilpi Streeter, Medical Scribe    The information in this document, created by the medical scribe for me, accurately reflects the services I personally performed and the decisions made by me. I have reviewed and approved this document for accuracy prior to leaving the patient care area.  May 15, 2018 4:50 PM    Irina Boss MS, PA-C      Again, thank you for allowing me to participate in the care of your patient.        Sincerely,        Irina Boss PA-C

## 2018-05-15 NOTE — PROGRESS NOTES
HPI:   Jason E Behary is a 42 year old male who presents for follow up of multiple bumps on face  chief complaint  Location: near right eye; below left lower lip; left cheek    Condition present for:  Bumps near right eye - 5 months, developed after sunburn while on vacation; below left lower lip and left cheek - 2 months   Previous treatments include: daily TAC, moisturizer, rubbing alcohol; eye drops for stye,ketoconazole    Review Of Systems  Eyes: negative  Ears/Nose/Throat: negative  Respiratory: No shortness of breath, dyspnea on exertion, cough, or hemoptysis  Cardiovascular: negative  Gastrointestinal: negative  Genitourinary: negative  Musculoskeletal: negative  Neurologic: negative  Psychiatric: negative    This document serves as a record of the services and decisions personally performed and made by Irina Boss, MS, PA-C. It was created on her behalf by Shilpi Streeter, a trained medical scribe. The creation of this document is based on the provider's statements to the medical scribe.  Shilpi Streeter 4:37 PM May 15, 2018    PHYSICAL EXAM:    /79  Pulse 84  SpO2 98%  Skin exam performed as follows: Type 2 skin. Mood appropriate  Alert and Oriented X 3. Well developed, well nourished in no distress.  General appearance: Normal  Head including face: Normal  Eyes: conjunctiva and lids: Normal  Mouth: Lips, teeth, gums: Normal  Neck: Normal  Chest-breast/axillae: Normal  Back: Normal  Spleen and liver: Normal  Cardiovascular: Exam of peripheral vascular system by observation for swelling, varicosities, edema: Normal  Genitalia: groin, buttocks: Normal  Extremities: digits/nails (clubbing): Normal  Eccrine and Apocrine glands: Normal  Right upper extremity: Normal  Left upper extremity: Normal  Right lower extremity: Normal  Left lower extremity: Normal  Skin: Scalp and body hair: See below    1. Inflamed keratotic papules on left cheek x 3    ASSESSMENT/PLAN:     1. Inflamed  seborrheic keratosis on left cheek x 3 (2nd treatment will biopsy at next OV if not responsive). As physically tender cryosurgery performed. Advised on post op care.    Using TAC on the face presumed Seborrheic dermatitis. He uses this every AM and has used it for the past 7 months. Still using this; clindamycin lotion did not help. If he does not use it, he becomes very flaky and dry. advised on chronic, recurrent condition. More suspect eyelid dermatitis. Advised to d/c steroid; discussed risks of skin atrophy and increased IOP with steroids.    --Start tacrolimus ointment BID PRN              Follow-up: 1 month  CC:   Scribed By:Shilpi Streeter, Medical Scribe    The information in this document, created by the medical scribe for me, accurately reflects the services I personally performed and the decisions made by me. I have reviewed and approved this document for accuracy prior to leaving the patient care area.  May 15, 2018 4:50 PM    Irina Boss MS, PA-C

## 2018-05-29 ENCOUNTER — MYC MEDICAL ADVICE (OUTPATIENT)
Dept: DERMATOLOGY | Facility: CLINIC | Age: 43
End: 2018-05-29

## 2018-05-29 DIAGNOSIS — H01.9 DERMATITIS OF EYELIDS OF BOTH EYES: Primary | ICD-10-CM

## 2018-05-29 NOTE — TELEPHONE ENCOUNTER
Called and LM for patient to call back in regards to symptoms and providers notes.  Sara RN-BSN  [facility]  [phone]

## 2018-05-30 RX ORDER — PREDNISONE 10 MG/1
TABLET ORAL
Qty: 30 TABLET | Refills: 0 | Status: SHIPPED | OUTPATIENT
Start: 2018-05-30 | End: 2018-12-05

## 2018-05-30 NOTE — TELEPHONE ENCOUNTER
Called and spoke with patient. Patient would like to move forward for the PO prednisone, pharmacy has been selected. Patient voiced understanding.   Should patient continue using cream or stop?    Sara RN-BSN  [facility]  [phone]

## 2018-05-30 NOTE — TELEPHONE ENCOUNTER
Called and LM for patient to call back in regards to providers notes and orders.  Sara RN-BSN  [facility]  [phone]

## 2018-05-30 NOTE — TELEPHONE ENCOUNTER
Patient called back. Educated patient on providers notes and orders. Patient voiced understanding.  Sara RN-BSN  [facility]  [phone]

## 2018-06-12 ENCOUNTER — OFFICE VISIT (OUTPATIENT)
Dept: DERMATOLOGY | Facility: CLINIC | Age: 43
End: 2018-06-12
Payer: COMMERCIAL

## 2018-06-12 VITALS — SYSTOLIC BLOOD PRESSURE: 129 MMHG | DIASTOLIC BLOOD PRESSURE: 83 MMHG | OXYGEN SATURATION: 97 % | HEART RATE: 74 BPM

## 2018-06-12 DIAGNOSIS — H01.9 DERMATITIS OF EYELIDS OF BOTH EYES: Primary | ICD-10-CM

## 2018-06-12 PROCEDURE — 99213 OFFICE O/P EST LOW 20 MIN: CPT | Performed by: PHYSICIAN ASSISTANT

## 2018-06-12 RX ORDER — DESONIDE 0.5 MG/G
CREAM TOPICAL
Qty: 60 G | Refills: 2 | Status: SHIPPED | OUTPATIENT
Start: 2018-06-12 | End: 2018-12-05

## 2018-06-12 NOTE — PATIENT INSTRUCTIONS
Vanicream free and clear shampoo exclusively for one month. Can find it at [facility].      Discontinue shampoo and Julia face wash    Follow up in 1 month

## 2018-06-12 NOTE — LETTER
6/12/2018         RE: Jason E Behary  [address] [facility] [address]        Dear Colleague,    Thank you for referring your patient, Jason E Behary, to the [facility]. Please see a copy of my visit note below.    HPI:   Jason E Behary is a 43 year old male who presents for recheck of facial rash. Tacrolimus burns and he became very agitated on prednisone.   chief complaint  Location: around eyes, upper eyelids    Condition present for:  Initially had flaking and irritation on forehead 2 years ago and has been using TAC cream daily. 5 months ago developed persistent red bumps by the right eyelid which do not resolve with TAC. Now have spread to the left  Previous treatments include: daily TAC, moisturizer, rubbing alcohol; eye drops for stye,ketoconazole, clindamycin gel, tacrolimus, prednisone    Review Of Systems  Eyes: negative  Ears/Nose/Throat: negative  Respiratory: No shortness of breath, dyspnea on exertion, cough, or hemoptysis  Cardiovascular: negative  Gastrointestinal: negative  Genitourinary: negative  Musculoskeletal: negative  Neurologic: negative  Psychiatric: negative    This document serves as a record of the services and decisions personally performed and made by Irina Boss, MS, PA-C. It was created on her behalf by Shilpi Streeter, a trained medical scribe. The creation of this document is based on the provider's statements to the medical scribe.  Shilpi Streeter 4:32 PM June 12, 2018    PHYSICAL EXAM:    /83  Pulse 74  SpO2 97%  Skin exam performed as follows: Type 2 skin. Mood appropriate  Alert and Oriented X 3. Well developed, well nourished in no distress.  General appearance: Normal  Head including face: Normal  Eyes: conjunctiva and lids: Normal  Mouth: Lips, teeth, gums: Normal  Neck: Normal  Chest-breast/axillae: Normal  Back: Normal  Spleen and liver: Normal  Cardiovascular: Exam of peripheral vascular system by  observation for swelling, varicosities, edema: Normal  Genitalia: groin, buttocks: Normal  Extremities: digits/nails (clubbing): Normal  Eccrine and Apocrine glands: Normal  Right upper extremity: Normal  Left upper extremity: Normal  Right lower extremity: Normal  Left lower extremity: Normal  Skin: Scalp and body hair: See below    1. Flaking and erythema on bilateral upper eyelids; inflammatory papules at corners of eyes    ASSESSMENT/PLAN:      1.  Eyelid dermatitis - advised on condition. Discussed difficulty in determining an exact etiology, but that the most common culprits are shampoos and nail polish. Discussed gentle skin care at length; discussed OTC products that are free of allergens.   Initially, he was using TAC on the face presumed Seborrheic dermatitis. He uses this every AM and has used it for the past 2 years. Still using this; clindamycin lotion did not help. If he does not useTAC he becomes very flaky and dry. advised on chronic, recurrent condition.  Eyes burn when he applies tacrolimus ointment.  Skin on the eyes have become more dry and peeling since starting current regimen.  Mens aracely face wash for acne.   Wears contacts and just started a new solution to clean them.   --D/s tacrolimus ointment  --Start desonide BID x 1 week then d/c  --Start Vanicream Free and Clear shampoo exclusively for the next 1 month - wash hair and face with this  --Start hypoallergenic natural tears for contact solution  --Discussed patch testing if it does not improve.          Follow-up: 1 month  CC:   Scribed By:Shilpi Streeter, Medical Scribe    The information in this document, created by the medical scribe for me, accurately reflects the services I personally performed and the decisions made by me. I have reviewed and approved this document for accuracy prior to leaving the patient care area.  June 12, 2018 4:42 PM    Irina Boss MS, PA-C      Again, thank you for allowing me to participate in  the care of your patient.        Sincerely,        Irina Stevenson PA-C

## 2018-06-12 NOTE — MR AVS SNAPSHOT
After Visit Summary   6/12/2018    Jason E Behary    MRN: [id number]           Patient Information     Date Of Birth          1975        Visit Information        Provider Department      6/12/2018 4:30 PM Irina Boss PA-C [facility]        Care Instructions    Vanicream free and clear shampoo exclusively for one month. Can find it at [employer].      Discontinue shampoo and Julia face wash    Follow up in 1 month            Follow-ups after your visit        Who to contact     If you have questions or need follow up information about today's clinic visit or your schedule please contact [facility] at [phone].  Normal or non-critical lab and imaging results will be communicated to you by MyChart, letter or phone within 4 business days after the clinic has received the results. If you do not hear from us within 7 days, please contact the clinic through [employer] or phone. If you have a critical or abnormal lab result, we will notify you by phone as soon as possible.  Submit refill requests through [employer] or call your pharmacy and they will forward the refill request to us. Please allow 3 business days for your refill to be completed.          Additional Information About Your Visit        MyChart Information     [employer] gives you secure access to your electronic health record. If you see a primary care provider, you can also send messages to your care team and make appointments. If you have questions, please call your primary care clinic.  If you do not have a primary care provider, please call [phone] and they will assist you.        Care EveryWhere ID     This is your Care EveryWhere ID. This could be used by other organizations to access your [place] medical records  [id number]        Your Vitals Were     Pulse Pulse Oximetry                74 97%           Blood Pressure from Last 3 Encounters:   06/12/18 129/83    05/15/18 131/79   04/12/18 134/76    Weight from Last 3 Encounters:   04/12/18 135.6 kg (299 lb)   11/03/17 134.7 kg (297 lb)   07/10/17 132.3 kg (291 lb 9.6 oz)              Today, you had the following     No orders found for display       Primary Care Provider Office Phone # Fax #    Evangelista Leonidas Saldana [phone] [address] [address]        Equal Access to Services     THOMAS MALDONADO : Hadii aad ku hadasho Soomaali, waaxda luqadaha, qaybta kaalmada adeegyada, waxay meeta hayjohnn rey casiano . So [facility] [phone].    ATENCIÓN: Si habla español, tiene a pena disposición servicios gratuitos de asistencia lingüística. [facility] [phone].    We comply with applicable federal civil rights laws and [place] laws. We do not discriminate on the basis of race, color, national origin, age, disability, sex, sexual orientation, or gender identity.            Thank you!     Thank you for choosing [facility]  for your care. Our goal is always to provide you with excellent care. Hearing back from our patients is one way we can continue to improve our services. Please take a few minutes to complete the written survey that you may receive in the mail after your visit with us. Thank you!             Your Updated Medication List - Protect others around you: Learn how to safely use, store and throw away your medicines at www.disposemymeds.org.          This list is accurate as of 6/12/18  4:46 PM.  Always use your most recent med list.                   Brand Name Dispense Instructions for use Diagnosis    ALPRAZolam 0.5 MG tablet    XANAX    20 tablet    Daily, 30 minutes prior to travel by plane    Fear of flying       amLODIPine 10 MG tablet    NORVASC    90 tablet    Take 1 tablet (10 mg) by mouth daily    Essential hypertension, benign       atorvastatin 20 MG tablet    LIPITOR    90 tablet    Take 1 tablet (20 mg) by mouth daily    Mixed  hyperlipidemia       cetirizine 10 MG tablet    zyrTEC    90 tablet    Take 1 tablet (10 mg) by mouth every evening    Allergic rhinitis, cause unspecified       clindamycin 1 % lotion    CLEOCIN T    60 mL    Apply to AA BID    Steroid-induced acne       glimepiride 2 MG tablet    AMARYL    90 tablet    TAKE 1 TABLET (2 MG) BY MOUTH EVERY MORNING (BEFORE BREAKFAST)    Type 2 diabetes mellitus without complication, without long-term current use of insulin (H)       hydrochlorothiazide 25 MG tablet    HYDRODIURIL    45 tablet    Take 0.5 tablets (12.5 mg) by mouth daily    Essential hypertension, benign       ketoconazole 2 % cream    NIZORAL    60 g    Apply to AA QD PRN    Seborrheic dermatitis       losartan 100 MG tablet    COZAAR    90 tablet    Take 1 tablet (100 mg) by mouth daily    Essential hypertension, benign       metFORMIN 1000 MG tablet    GLUCOPHAGE    180 tablet    Take 1 tablet (1,000 mg) by mouth 2 times daily (with meals)    Type 2 diabetes mellitus without complication, without long-term current use of insulin (H)       predniSONE 10 MG tablet    DELTASONE    30 tablet    4 tabs PO QAM x 3 days then 3 tabs QAM x 3 days then 2 tabs QAM x 3 days then 1 tab x 3 days    Dermatitis of eyelids of both eyes       scopolamine 72 hr patch    TRANSDERM    12 patch    Apply 1 patch to hairless area behind one ear at least 4 hours before travel.  Remove old patch and change every 3 days (72 hours).    H/O motion sickness       tacrolimus 0.1 % ointment    PROTOPIC    60 g    Apply topically 2 times daily    Dermatitis of eyelids of both eyes       triamcinolone 0.1 % cream    KENALOG    80 g    Apply topically every morning    Dermatitis       trimethoprim-polymyxin b ophthalmic solution    POLYTRIM    1 Bottle    Place 1 drop into both eyes every 3 hours    Acute follicular conjunctivitis, unspecified laterality

## 2018-06-12 NOTE — PROGRESS NOTES
HPI:   Jason E Behary is a 43 year old male who presents for recheck of facial rash. Tacrolimus burns and he became very agitated on prednisone.   chief complaint  Location: around eyes, upper eyelids    Condition present for:  Initially had flaking and irritation on forehead 2 years ago and has been using TAC cream daily. 5 months ago developed persistent red bumps by the right eyelid which do not resolve with TAC. Now have spread to the left  Previous treatments include: daily TAC, moisturizer, rubbing alcohol; eye drops for stye,ketoconazole, clindamycin gel, tacrolimus, prednisone    Review Of Systems  Eyes: negative  Ears/Nose/Throat: negative  Respiratory: No shortness of breath, dyspnea on exertion, cough, or hemoptysis  Cardiovascular: negative  Gastrointestinal: negative  Genitourinary: negative  Musculoskeletal: negative  Neurologic: negative  Psychiatric: negative    This document serves as a record of the services and decisions personally performed and made by Irina Boss, MS, PA-C. It was created on her behalf by Shilpi Streeter, a trained medical scribe. The creation of this document is based on the provider's statements to the medical scribe.  Shilpi Streeter 4:32 PM June 12, 2018    PHYSICAL EXAM:    /83  Pulse 74  SpO2 97%  Skin exam performed as follows: Type 2 skin. Mood appropriate  Alert and Oriented X 3. Well developed, well nourished in no distress.  General appearance: Normal  Head including face: Normal  Eyes: conjunctiva and lids: Normal  Mouth: Lips, teeth, gums: Normal  Neck: Normal  Chest-breast/axillae: Normal  Back: Normal  Spleen and liver: Normal  Cardiovascular: Exam of peripheral vascular system by observation for swelling, varicosities, edema: Normal  Genitalia: groin, buttocks: Normal  Extremities: digits/nails (clubbing): Normal  Eccrine and Apocrine glands: Normal  Right upper extremity: Normal  Left upper extremity: Normal  Right lower  extremity: Normal  Left lower extremity: Normal  Skin: Scalp and body hair: See below    1. Flaking and erythema on bilateral upper eyelids; inflammatory papules at corners of eyes    ASSESSMENT/PLAN:      1.  Eyelid dermatitis - advised on condition. Discussed difficulty in determining an exact etiology, but that the most common culprits are shampoos and nail polish. Discussed gentle skin care at length; discussed OTC products that are free of allergens.   Initially, he was using TAC on the face presumed Seborrheic dermatitis. He uses this every AM and has used it for the past 2 years. Still using this; clindamycin lotion did not help. If he does not useTAC he becomes very flaky and dry. advised on chronic, recurrent condition.  Eyes burn when he applies tacrolimus ointment.  Skin on the eyes have become more dry and peeling since starting current regimen.  Mens aracely face wash for acne.   Wears contacts and just started a new solution to clean them.   --D/s tacrolimus ointment  --Start desonide BID x 1 week then d/c  --Start Vanicream Free and Clear shampoo exclusively for the next 1 month - wash hair and face with this  --Start hypoallergenic natural tears for contact solution  --Discussed patch testing if it does not improve.          Follow-up: 1 month  CC:   Scribed By:Shilpi Streeter, Medical Scribe    The information in this document, created by the medical scribe for me, accurately reflects the services I personally performed and the decisions made by me. I have reviewed and approved this document for accuracy prior to leaving the patient care area.  June 12, 2018 4:42 PM    Irina Boss MS, PA-C

## 2018-07-10 ENCOUNTER — OFFICE VISIT (OUTPATIENT)
Dept: DERMATOLOGY | Facility: CLINIC | Age: 43
End: 2018-07-10
Payer: COMMERCIAL

## 2018-07-10 VITALS — DIASTOLIC BLOOD PRESSURE: 82 MMHG | OXYGEN SATURATION: 99 % | HEART RATE: 78 BPM | SYSTOLIC BLOOD PRESSURE: 133 MMHG

## 2018-07-10 DIAGNOSIS — L21.9 DERMATITIS, SEBORRHEIC: Primary | ICD-10-CM

## 2018-07-10 PROCEDURE — 99213 OFFICE O/P EST LOW 20 MIN: CPT | Performed by: PHYSICIAN ASSISTANT

## 2018-07-10 RX ORDER — FLUCONAZOLE 150 MG/1
TABLET ORAL
Qty: 5 TABLET | Refills: 3 | Status: SHIPPED | OUTPATIENT
Start: 2018-07-10 | End: 2018-12-05

## 2018-07-10 NOTE — PROGRESS NOTES
HPI:   Jason E Behary is a 43 year old male who presents for recheck of facial rash. Tacrolimus burns and he became very agitated on prednisone.   chief complaint  Location: around eyes, upper eyelids    Condition present for:  Initially had flaking and irritation on forehead 2 years ago and has been using TAC cream daily. 5 months ago developed persistent red bumps by the right eyelid which do not resolve with TAC. Now have spread to the left  Previous treatments include: daily TAC, moisturizer, rubbing alcohol; eye drops for stye,ketoconazole, clindamycin gel, tacrolimus, prednisone    Review Of Systems  Eyes: negative  Ears/Nose/Throat: negative  Respiratory: No shortness of breath, dyspnea on exertion, cough, or hemoptysis  Cardiovascular: negative  Gastrointestinal: negative  Genitourinary: negative  Musculoskeletal: negative  Neurologic: negative  Psychiatric: negative    This document serves as a record of the services and decisions personally performed and made by Irina Boss, MS, PA-C. It was created on her behalf by Shilpi Streeter, a trained medical scribe. The creation of this document is based on the provider's statements to the medical scribe.  Shilpi Streeter 4:30 PM July 10, 2018    PHYSICAL EXAM:    /82  Pulse 78  SpO2 99%  Skin exam performed as follows: Type 2 skin. Mood appropriate  Alert and Oriented X 3. Well developed, well nourished in no distress.  General appearance: Normal  Head including face: Normal  Eyes: conjunctiva and lids: Normal  Mouth: Lips, teeth, gums: Normal  Neck: Normal  Chest-breast/axillae: Normal  Back: Normal  Spleen and liver: Normal  Cardiovascular: Exam of peripheral vascular system by observation for swelling, varicosities, edema: Normal  Genitalia: groin, buttocks: Normal  Extremities: digits/nails (clubbing): Normal  Eccrine and Apocrine glands: Normal  Right upper extremity: Normal  Left upper extremity: Normal  Right lower  extremity: Normal  Left lower extremity: Normal  Skin: Scalp and body hair: See below    1. Flaking and erythema on bilateral upper eyelids; inflammatory papules at corners of eyes    ASSESSMENT/PLAN:      1.  Eyelid dermatitis vs stubborn seborrheic dermatitis - advised on chronic, recurrent condition.      Initially, he was using TAC on the face presumed Seborrheic dermatitis. He uses this every AM and has used it for the past 2 years. Still using this; but has since stopped. clindamycin lotion did not help. If he does not use TAC he becomes very flaky and dry.   Eyes burn when he applies tacrolimus ointment.  Skin on the eyes have become more dry and peeling since starting current regimen.  Mens aracely face wash for acne.   Wears contacts and just started a new solution to clean them. Today, skin on the eyes have not improved and he is very frustrated. Denies having weakness or fatigue.     --Can consider biopsy to determine seb derm vs irritant derm vs dermatomyositis - declines this today.   --Continue Vanicream Free and Clear shampoo exclusively for the next 1 month - wash hair and face with this  --Continue hypoallergenic natural tears for contact solution  --Start fluconazole 150 mg daily x  5 days  --If does not resolve with new medication recommend evaluation with Dr. Quiros.       Follow-up: 1 month/PRN sooner  CC:   Scribed By:Shilpi Streeter, Medical Scribe    The information in this document, created by the medical scribe for me, accurately reflects the services I personally performed and the decisions made by me. I have reviewed and approved this document for accuracy prior to leaving the patient care area.  July 10, 2018 4:38 PM    Irina Boss MS, PA-C

## 2018-07-10 NOTE — LETTER
7/10/2018         RE: Jason E Behary  [address] [facility] [address]        Dear Colleague,    Thank you for referring your patient, Jason E Behary, to the [facility]. Please see a copy of my visit note below.    HPI:   Jason E Behary is a 43 year old male who presents for recheck of facial rash. Tacrolimus burns and he became very agitated on prednisone.   chief complaint  Location: around eyes, upper eyelids    Condition present for:  Initially had flaking and irritation on forehead 2 years ago and has been using TAC cream daily. 5 months ago developed persistent red bumps by the right eyelid which do not resolve with TAC. Now have spread to the left  Previous treatments include: daily TAC, moisturizer, rubbing alcohol; eye drops for stye,ketoconazole, clindamycin gel, tacrolimus, prednisone    Review Of Systems  Eyes: negative  Ears/Nose/Throat: negative  Respiratory: No shortness of breath, dyspnea on exertion, cough, or hemoptysis  Cardiovascular: negative  Gastrointestinal: negative  Genitourinary: negative  Musculoskeletal: negative  Neurologic: negative  Psychiatric: negative    This document serves as a record of the services and decisions personally performed and made by Irina Boss, MS, PA-C. It was created on her behalf by Shilpi Streeter, a trained medical scribe. The creation of this document is based on the provider's statements to the medical scribe.  Shilpi Streeter 4:30 PM July 10, 2018    PHYSICAL EXAM:    /82  Pulse 78  SpO2 99%  Skin exam performed as follows: Type 2 skin. Mood appropriate  Alert and Oriented X 3. Well developed, well nourished in no distress.  General appearance: Normal  Head including face: Normal  Eyes: conjunctiva and lids: Normal  Mouth: Lips, teeth, gums: Normal  Neck: Normal  Chest-breast/axillae: Normal  Back: Normal  Spleen and liver: Normal  Cardiovascular: Exam of peripheral vascular system by  observation for swelling, varicosities, edema: Normal  Genitalia: groin, buttocks: Normal  Extremities: digits/nails (clubbing): Normal  Eccrine and Apocrine glands: Normal  Right upper extremity: Normal  Left upper extremity: Normal  Right lower extremity: Normal  Left lower extremity: Normal  Skin: Scalp and body hair: See below    1. Flaking and erythema on bilateral upper eyelids; inflammatory papules at corners of eyes    ASSESSMENT/PLAN:      1.  Eyelid dermatitis vs stubborn seborrheic dermatitis - advised on chronic, recurrent condition.      Initially, he was using TAC on the face presumed Seborrheic dermatitis. He uses this every AM and has used it for the past 2 years. Still using this; but has since stopped. clindamycin lotion did not help. If he does not use TAC he becomes very flaky and dry.   Eyes burn when he applies tacrolimus ointment.  Skin on the eyes have become more dry and peeling since starting current regimen.  Mens aracely face wash for acne.   Wears contacts and just started a new solution to clean them. Today, skin on the eyes have not improved and he is very frustrated. Denies having weakness or fatigue.     --Can consider biopsy to determine seb derm vs irritant derm vs dermatomyositis - declines this today.   --Continue Vanicream Free and Clear shampoo exclusively for the next 1 month - wash hair and face with this  --Continue hypoallergenic natural tears for contact solution  --Start fluconazole 150 mg daily x  5 days  --If does not resolve with new medication recommend evaluation with Dr. Quiros.       Follow-up: 1 month/PRN sooner  CC:   Scribed By:Shilpi Streeter, Medical Scribe    The information in this document, created by the medical scribe for me, accurately reflects the services I personally performed and the decisions made by me. I have reviewed and approved this document for accuracy prior to leaving the patient care area.  July 10, 2018 4:38 PM    Irina Boss  GENNY HOPPER      Again, thank you for allowing me to participate in the care of your patient.        Sincerely,        Irina Stevenson PA-C

## 2018-07-10 NOTE — MR AVS SNAPSHOT
After Visit Summary   7/10/2018    Jason E Behary    MRN: [id number]           Patient Information     Date Of Birth          1975        Visit Information        Provider Department      7/10/2018 4:30 PM Irina Boss PA-C [facility]        Today's Diagnoses     Dermatitis, seborrheic    -  1       Follow-ups after your visit        Who to contact     If you have questions or need follow up information about today's clinic visit or your schedule please contact [facility] directly at [phone].  Normal or non-critical lab and imaging results will be communicated to you by [employer], letter or phone within 4 business days after the clinic has received the results. If you do not hear from us within 7 days, please contact the clinic through [employer] or phone. If you have a critical or abnormal lab result, we will notify you by phone as soon as possible.  Submit refill requests through [employer] or call your pharmacy and they will forward the refill request to us. Please allow 3 business days for your refill to be completed.          Additional Information About Your Visit        MyChart Information     [employer] gives you secure access to your electronic health record. If you see a primary care provider, you can also send messages to your care team and make appointments. If you have questions, please call your primary care clinic.  If you do not have a primary care provider, please call [phone] and they will assist you.        Care EveryWhere ID     This is your Care EveryWhere ID. This could be used by other organizations to access your [place] medical records  [id number]        Your Vitals Were     Pulse Pulse Oximetry                78 99%           Blood Pressure from Last 3 Encounters:   07/10/18 133/82   06/12/18 129/83   05/15/18 131/79    Weight from Last 3 Encounters:   04/12/18 135.6 kg (299 lb)   11/03/17 134.7 kg (297 lb)    07/10/17 132.3 kg (291 lb 9.6 oz)              Today, you had the following     No orders found for display         Today's Medication Changes          These changes are accurate as of 7/10/18  5:31 PM.  If you have any questions, ask your nurse or doctor.               Start taking these medicines.        Dose/Directions    fluconazole 150 MG tablet   Commonly known as:  DIFLUCAN   Used for:  Dermatitis, seborrheic   Started by:  Irina Boss PA-C        1 tab PO daily x 5 days   Quantity:  5 tablet   Refills:  3            Where to get your medicines      These medications were sent to [place] [address] [place] [address] [facility] [place] [facility] [address]     Phone:  [phone]     fluconazole 150 MG tablet                Primary Care Provider Office Phone # Fax #    Evangelista Saldana [phone]       [address] [place] [address]        Equal Access to Services     THOMAS MALDONADO : Hadii shahzad ku hadasho Soomaali, waaxda luqadaha, qaybta kaalmada adeegyada, waxay meeta hayandre casiano . So [facility] [phone].    ATENCIÓN: Si habla español, tiene a pena disposición servicios gratuitos de asistencia lingüística. Marinaame al [phone].    We comply with applicable federal civil rights laws and [place] laws. We do not discriminate on the basis of race, color, national origin, age, disability, sex, sexual orientation, or gender identity.            Thank you!     Thank you for choosing [facility]  for your care. Our goal is always to provide you with excellent care. Hearing back from our patients is one way we can continue to improve our services. Please take a few minutes to complete the written survey that you may receive in the mail after your visit with us. Thank you!             Your Updated Medication List - Protect others around you: Learn how to safely use, store and throw away your medicines at  www.disposemymeds.org.          This list is accurate as of 7/10/18  5:31 PM.  Always use your most recent med list.                   Brand Name Dispense Instructions for use Diagnosis    ALPRAZolam 0.5 MG tablet    XANAX    20 tablet    Daily, 30 minutes prior to travel by plane    Fear of flying       amLODIPine 10 MG tablet    NORVASC    90 tablet    Take 1 tablet (10 mg) by mouth daily    Essential hypertension, benign       atorvastatin 20 MG tablet    LIPITOR    90 tablet    Take 1 tablet (20 mg) by mouth daily    Mixed hyperlipidemia       cetirizine 10 MG tablet    zyrTEC    90 tablet    Take 1 tablet (10 mg) by mouth every evening    Allergic rhinitis, cause unspecified       clindamycin 1 % lotion    CLEOCIN T    60 mL    Apply to AA BID    Steroid-induced acne       desonide 0.05 % cream    DESOWEN    60 g    Apply to AA QD    Dermatitis of eyelids of both eyes       fluconazole 150 MG tablet    DIFLUCAN    5 tablet    1 tab PO daily x 5 days    Dermatitis, seborrheic       glimepiride 2 MG tablet    AMARYL    90 tablet    TAKE 1 TABLET (2 MG) BY MOUTH EVERY MORNING (BEFORE BREAKFAST)    Type 2 diabetes mellitus without complication, without long-term current use of insulin (H)       hydrochlorothiazide 25 MG tablet    HYDRODIURIL    45 tablet    Take 0.5 tablets (12.5 mg) by mouth daily    Essential hypertension, benign       ketoconazole 2 % cream    NIZORAL    60 g    Apply to AA QD PRN    Seborrheic dermatitis       losartan 100 MG tablet    COZAAR    90 tablet    Take 1 tablet (100 mg) by mouth daily    Essential hypertension, benign       metFORMIN 1000 MG tablet    GLUCOPHAGE    180 tablet    Take 1 tablet (1,000 mg) by mouth 2 times daily (with meals)    Type 2 diabetes mellitus without complication, without long-term current use of insulin (H)       predniSONE 10 MG tablet    DELTASONE    30 tablet    4 tabs PO QAM x 3 days then 3 tabs QAM x 3 days then 2 tabs QAM x 3 days then 1 tab x 3 days     Dermatitis of eyelids of both eyes       scopolamine 72 hr patch    TRANSDERM    12 patch    Apply 1 patch to hairless area behind one ear at least 4 hours before travel.  Remove old patch and change every 3 days (72 hours).    H/O motion sickness       tacrolimus 0.1 % ointment    PROTOPIC    60 g    Apply topically 2 times daily    Dermatitis of eyelids of both eyes       triamcinolone 0.1 % cream    KENALOG    80 g    Apply topically every morning    Dermatitis       trimethoprim-polymyxin b ophthalmic solution    POLYTRIM    1 Bottle    Place 1 drop into both eyes every 3 hours    Acute follicular conjunctivitis, unspecified laterality

## 2018-07-16 ENCOUNTER — MYC MEDICAL ADVICE (OUTPATIENT)
Dept: DERMATOLOGY | Facility: CLINIC | Age: 43
End: 2018-07-16

## 2018-07-16 DIAGNOSIS — L30.9 ACUTE DERMATITIS: Primary | ICD-10-CM

## 2018-07-17 RX ORDER — TRIAMCINOLONE ACETONIDE 1 MG/G
CREAM TOPICAL
Qty: 80 G | Refills: 1 | Status: SHIPPED | OUTPATIENT
Start: 2018-07-17 | End: 2018-12-03

## 2018-07-17 NOTE — TELEPHONE ENCOUNTER
1 refill of Triamcinolone given her [facility] protocol.    KATELYNN Ruiz-BSN  [facility]  [phone]

## 2018-07-17 NOTE — TELEPHONE ENCOUNTER
[employer] Message Sent:    Alden Bean-    OSIEL called you in 1 refill for the Triamcinolone cream.     Please let me know if you have any further questions.    KATELYNN Ruiz-BSN  [facility]  [phone]

## 2018-07-17 NOTE — TELEPHONE ENCOUNTER
"Please see My Chart communication below. Per last office visit note on 7/10/18:     Initially, he was using TAC on the face presumed Seborrheic dermatitis. He uses this every AM and has used it for the past 2 years. Still using this; but has since stopped. clindamycin lotion did not help. If he does not use TAC he becomes very flaky and dry.   Eyes burn when he applies tacrolimus ointment.  Skin on the eyes have become more dry and peeling since starting current regimen.  Mens aracely face wash for acne.   Wears contacts and just started a new solution to clean them. Today, skin on the eyes have not improved and he is very frustrated. Denies having weakness or fatigue.      --Can consider biopsy to determine seb derm vs irritant derm vs dermatomyositis - declines this today.   --Continue Vanicream Free and Clear shampoo exclusively for the next 1 month - wash hair and face with this  --Continue hypoallergenic natural tears for contact solution  --Start fluconazole 150 mg daily x  5 days  --If does not resolve with new medication recommend evaluation with Dr. Quiros.       I will let him know about following up with Dr Quiros. Pt also requesting refill of \"1st cream\" he was using. Please advise. Thank you.    Kelly RAMIREZ   Allergy RN      "

## 2018-08-13 DIAGNOSIS — E11.9 TYPE 2 DIABETES MELLITUS WITHOUT COMPLICATION, WITHOUT LONG-TERM CURRENT USE OF INSULIN (H): ICD-10-CM

## 2018-08-13 NOTE — TELEPHONE ENCOUNTER
"Requested Prescriptions   Pending Prescriptions Disp Refills     glimepiride (AMARYL) 2 MG tablet [Pharmacy Med Name: GLIMEPIRIDE 2 MG TABLET] 90 tablet 0     Sig: TAKE 1 TABLET (2 MG) BY MOUTH EVERY MORNING (BEFORE BREAKFAST)    Sulfonylurea Agents Failed    8/13/2018 11:49 AM       Failed - Patient has documented A1c within the specified period of time.    If HgbA1C is 8 or greater, it needs to be on file within the past 3 months.  If less than 8, must be on file within the past 6 months.     Recent Labs   Lab Test  04/03/18   0819   A1C  8.3*            Passed - Blood pressure less than 140/90 in past 6 months    BP Readings from Last 3 Encounters:   07/10/18 133/82   06/12/18 129/83   05/15/18 131/79                Passed - Patient has documented LDL within the past 12 mos.    Recent Labs   Lab Test  04/03/18   0819   LDL  65            Passed - Patient has had a Microalbumin in the past 12 mos.    Recent Labs   Lab Test  09/27/17   1138  03/12/16   0950   09/20/11   1722   MICROALB   --   80*   < >   --    MICROL  <5   --    --    --    UALB   --    --    --   Trace   UMALCR  Unable to calculate due to low value  *   < >   --     < > = values in this interval not displayed.            Passed - Patient is age 18 or older       Passed - Patient has a recent creatinine (normal) within the past 12 mos.    Recent Labs   Lab Test  04/03/18   0819   CR  0.88            Passed - Recent (6 mo) or future (30 days) visit within the authorizing provider's specialty    Patient had office visit in the last 6 months or has a visit in the next 30 days with authorizing provider or within the authorizing provider's specialty.  See \"Patient Info\" tab in inbasket, or \"Choose Columns\" in Meds & Orders section of the refill encounter.            Last Written Prescription Date:  1/2/18  Last Fill Quantity: 90,  # refills: 0   Last office visit: 4/12/2018 with prescribing provider:  Shana   Future Office Visit:      "

## 2018-08-14 RX ORDER — GLIMEPIRIDE 2 MG/1
TABLET ORAL
Qty: 90 TABLET | Refills: 0 | Status: SHIPPED | OUTPATIENT
Start: 2018-08-14 | End: 2018-10-26

## 2018-09-24 DIAGNOSIS — H10.019: ICD-10-CM

## 2018-09-24 NOTE — TELEPHONE ENCOUNTER
Requested Prescriptions   Pending Prescriptions Disp Refills     trimethoprim-polymyxin b (POLYTRIM) ophthalmic solution [Pharmacy Med Name: POLYMYXIN B-TMP EYE DROPS] 10 mL 0     Sig: PLACE 1 DROP INTO BOTH EYES EVERY 3 HOURS    There is no refill protocol information for this order        Last Written Prescription Date:  7/10/17  Last Fill Quantity: 1,  # refills: 11   Last office visit: 4/12/2018 with prescribing provider:  4/12/18   Future Office Visit:

## 2018-09-25 RX ORDER — POLYMYXIN B SULFATE AND TRIMETHOPRIM 1; 10000 MG/ML; [USP'U]/ML
SOLUTION OPHTHALMIC
Qty: 10 ML | Refills: 0 | Status: SHIPPED | OUTPATIENT
Start: 2018-09-25 | End: 2018-12-03

## 2018-10-23 DIAGNOSIS — I10 ESSENTIAL HYPERTENSION, BENIGN: ICD-10-CM

## 2018-10-23 RX ORDER — HYDROCHLOROTHIAZIDE 25 MG/1
TABLET ORAL
Qty: 45 TABLET | Refills: 1 | Status: SHIPPED | OUTPATIENT
Start: 2018-10-23 | End: 2018-12-03

## 2018-10-23 NOTE — TELEPHONE ENCOUNTER
"Requested Prescriptions   Pending Prescriptions Disp Refills     hydrochlorothiazide (HYDRODIURIL) 25 MG tablet [Pharmacy Med Name: HYDROCHLOROTHIAZIDE 25 MG TAB] 45 tablet 3     Sig: TAKE 0.5 TABLETS (12.5 MG) BY MOUTH DAILY    Diuretics (Including Combos) Protocol Passed    10/23/2018  1:50 AM       Passed - Blood pressure under 140/90 in past 12 months    BP Readings from Last 3 Encounters:   07/10/18 133/82   06/12/18 129/83   05/15/18 131/79                Passed - Recent (12 mo) or future (30 days) visit within the authorizing provider's specialty    Patient had office visit in the last 12 months or has a visit in the next 30 days with authorizing provider or within the authorizing provider's specialty.  See \"Patient Info\" tab in inbasket, or \"Choose Columns\" in Meds & Orders section of the refill encounter.             Passed - Patient is age 18 or older       Passed - Normal serum creatinine on file in past 12 months    Recent Labs   Lab Test  04/03/18   0819   CR  0.88             Passed - Normal serum potassium on file in past 12 months    Recent Labs   Lab Test  04/03/18   0819   POTASSIUM  4.4                   Passed - Normal serum sodium on file in past 12 months    Recent Labs   Lab Test  04/03/18   0819   NA  139              Last Written Prescription Date:  11/3/17  Last Fill Quantity: 45,  # refills: 3   Last office visit: 4/12/2018 with prescribing provider:  4/12/18   Future Office Visit:      "

## 2018-10-26 DIAGNOSIS — E11.9 TYPE 2 DIABETES MELLITUS WITHOUT COMPLICATION, WITHOUT LONG-TERM CURRENT USE OF INSULIN (H): ICD-10-CM

## 2018-10-26 RX ORDER — GLIMEPIRIDE 2 MG/1
TABLET ORAL
Qty: 30 TABLET | Refills: 0 | Status: SHIPPED | OUTPATIENT
Start: 2018-10-26 | End: 2018-12-05

## 2018-10-26 NOTE — TELEPHONE ENCOUNTER
"Requested Prescriptions   Pending Prescriptions Disp Refills     glimepiride (AMARYL) 2 MG tablet [Pharmacy Med Name: GLIMEPIRIDE 2 MG TABLET] 90 tablet 0     Sig: TAKE 1 TABLET (2 MG) BY MOUTH EVERY MORNING (BEFORE BREAKFAST)    Sulfonylurea Agents Failed    10/26/2018  8:09 AM       Failed - Patient has documented A1c within the specified period of time.    If HgbA1C is 8 or greater, it needs to be on file within the past 3 months.  If less than 8, must be on file within the past 6 months.     Recent Labs   Lab Test  04/03/18   0819   A1C  8.3*            Failed - Recent (6 mo) or future (30 days) visit within the authorizing provider's specialty    Patient had office visit in the last 6 months or has a visit in the next 30 days with authorizing provider or within the authorizing provider's specialty.  See \"Patient Info\" tab in inbasket, or \"Choose Columns\" in Meds & Orders section of the refill encounter.           Passed - Blood pressure less than 140/90 in past 6 months    BP Readings from Last 3 Encounters:   07/10/18 133/82   06/12/18 129/83   05/15/18 131/79                Passed - Patient has documented LDL within the past 12 mos.    Recent Labs   Lab Test  04/03/18   0819   LDL  65            Passed - Patient has had a Microalbumin in the past 12 mos.    Recent Labs   Lab Test  09/27/17   1138  03/12/16   0950   09/20/11   1722   MICROALB   --   80*   < >   --    MICROL  <5   --    --    --    UALB   --    --    --   Trace   UMALCR  Unable to calculate due to low value  *   < >   --     < > = values in this interval not displayed.            Passed - Patient is age 18 or older       Passed - Patient has a recent creatinine (normal) within the past 12 mos.    Recent Labs   Lab Test  04/03/18   0819   CR  0.88             Last Written Prescription Date:  08/14/2018  Last Fill Quantity: 90,  # refills: 0   Last office visit: 4/12/2018 with prescribing provider:  Dr Saldana   Future Office Visit:      "

## 2018-10-26 NOTE — LETTER
[facility]  [address] [id number] [phone]            Jason E Behary  [address] [facility] [address]        October 26, 2018    Dear Vikas,    While refilling your prescription today, we noticed that you are due to have labs drawn and see your provider.  We will refill your prescription for 30 days, but a follow-up appointment must be made before any additional refills can be approved.     Taking care of your health is important to us and we look forward to seeing you in the near future.  Please call us at [phone] or [phone] (or use [email or web address]) to schedule an appointment.     Please disregard this notice if you have already made an appointment.    Sincerely,        [facility]

## 2018-10-31 ENCOUNTER — MYC MEDICAL ADVICE (OUTPATIENT)
Dept: INTERNAL MEDICINE | Facility: CLINIC | Age: 43
End: 2018-10-31

## 2018-11-10 DIAGNOSIS — E11.9 TYPE 2 DIABETES MELLITUS WITHOUT COMPLICATION, WITHOUT LONG-TERM CURRENT USE OF INSULIN (H): ICD-10-CM

## 2018-11-10 NOTE — TELEPHONE ENCOUNTER
Requested Prescriptions   Pending Prescriptions Disp Refills     glimepiride (AMARYL) 2 MG tablet [Pharmacy Med Name: GLIMEPIRIDE 2 MG TABLET] 90 tablet 0    Last Written Prescription Date:  10/26/2018  Last Fill Quantity: 30,  # refills: 0   Last office visit: 4/12/2018 with prescribing provider:  4/12/2018   Future Office Visit:   Next 5 appointments (look out 90 days)     Nov 23, 2018  7:30 AM CST   [facility]   [facility]    [address] [place] [address]   [phone]            Dec 03, 2018  4:15 PM CST   Shirat Cheng with Evangelista Saldana MD   [facility]    [address] [place] [address]   [phone]                  Sig: TAKE 1 TABLET (2 MG) BY MOUTH EVERY MORNING (BEFORE BREAKFAST)    Sulfonylurea Agents Failed    11/10/2018 11:19 AM       Failed - Patient has documented A1c within the specified period of time.    If HgbA1C is 8 or greater, it needs to be on file within the past 3 months.  If less than 8, must be on file within the past 6 months.     Recent Labs   Lab Test  04/03/18   0819   A1C  8.3*            Passed - Blood pressure less than 140/90 in past 6 months    BP Readings from Last 3 Encounters:   07/10/18 133/82   06/12/18 129/83   05/15/18 131/79                Passed - Patient has documented LDL within the past 12 mos.    Recent Labs   Lab Test  04/03/18   0819   LDL  65            Passed - Patient has had a Microalbumin in the past 12 mos.    Recent Labs   Lab Test  09/27/17   1138  03/12/16   0950   09/20/11   1722   MICROALB   --   80*   < >   --    MICROL  <5   --    --    --    UALB   --    --    --   Trace   UMALCR  Unable to calculate due to low value  *   < >   --     < > = values in this interval not displayed.            Passed - Patient is age 18 or older       Passed - Patient has a recent  "creatinine (normal) within the past 12 mos.    Recent Labs   Lab Test  04/03/18   0819   CR  0.88            Passed - Recent (6 mo) or future (30 days) visit within the authorizing provider's specialty    Patient had office visit in the last 6 months or has a visit in the next 30 days with authorizing provider or within the authorizing provider's specialty.  See \"Patient Info\" tab in inbasket, or \"Choose Columns\" in Meds & Orders section of the refill encounter.              "

## 2018-11-12 NOTE — TELEPHONE ENCOUNTER
Routing refill request to provider for review/approval because:  Fozia given x1 and patient did not follow up, please advise  Patient needs to be seen because:  Due for diabetes follow up with labs.     Patient has upcoming OV. PCP please sign refill if appropriate.    Sandra JESUS RN, BSN, PHN

## 2018-11-13 RX ORDER — GLIMEPIRIDE 2 MG/1
TABLET ORAL
Qty: 90 TABLET | Refills: 0 | Status: SHIPPED | OUTPATIENT
Start: 2018-11-13 | End: 2018-12-03

## 2018-11-21 DIAGNOSIS — E78.2 MIXED HYPERLIPIDEMIA: ICD-10-CM

## 2018-11-21 RX ORDER — ATORVASTATIN CALCIUM 20 MG/1
TABLET, FILM COATED ORAL
Qty: 90 TABLET | Refills: 1 | Status: SHIPPED | OUTPATIENT
Start: 2018-11-21 | End: 2018-12-03

## 2018-11-21 NOTE — TELEPHONE ENCOUNTER
"Requested Prescriptions   Pending Prescriptions Disp Refills     atorvastatin (LIPITOR) 20 MG tablet [Pharmacy Med Name: ATORVASTATIN 20 MG TABLET] 90 tablet 0     Sig: TAKE 1 TABLET (20 MG) BY MOUTH DAILY. PT WILL CALL WHEN NEEDS    Statins Protocol Passed    11/21/2018  1:46 AM       Passed - LDL on file in past 12 months    Recent Labs   Lab Test  04/03/18   0819   LDL  65            Passed - No abnormal creatine kinase in past 12 months    Recent Labs   Lab Test  03/16/13   0924   CKT  82               Passed - Recent (12 mo) or future (30 days) visit within the authorizing provider's specialty    Patient had office visit in the last 12 months or has a visit in the next 30 days with authorizing provider or within the authorizing provider's specialty.  See \"Patient Info\" tab in inbasket, or \"Choose Columns\" in Meds & Orders section of the refill encounter.             Passed - Patient is age 18 or older        Prescription approved per [facility] Refill Protocol.    "

## 2018-11-23 DIAGNOSIS — E11.9 TYPE 2 DIABETES MELLITUS WITHOUT COMPLICATION, WITHOUT LONG-TERM CURRENT USE OF INSULIN (H): ICD-10-CM

## 2018-11-23 LAB
ALBUMIN SERPL-MCNC: 3.8 G/DL (ref 3.4–5)
ALP SERPL-CCNC: 69 U/L (ref 40–150)
ALT SERPL W P-5'-P-CCNC: 59 U/L (ref 0–70)
ANION GAP SERPL CALCULATED.3IONS-SCNC: 7 MMOL/L (ref 3–14)
AST SERPL W P-5'-P-CCNC: 34 U/L (ref 0–45)
BILIRUB SERPL-MCNC: 0.5 MG/DL (ref 0.2–1.3)
BUN SERPL-MCNC: 17 MG/DL (ref 7–30)
CALCIUM SERPL-MCNC: 9 MG/DL (ref 8.5–10.1)
CHLORIDE SERPL-SCNC: 108 MMOL/L (ref 94–109)
CO2 SERPL-SCNC: 26 MMOL/L (ref 20–32)
CREAT SERPL-MCNC: 0.88 MG/DL (ref 0.66–1.25)
GFR SERPL CREATININE-BSD FRML MDRD: >90 ML/MIN/1.7M2
GLUCOSE SERPL-MCNC: 196 MG/DL (ref 70–99)
HBA1C MFR BLD: 7.6 % (ref 0–5.6)
POTASSIUM SERPL-SCNC: 4.4 MMOL/L (ref 3.4–5.3)
PROT SERPL-MCNC: 7.1 G/DL (ref 6.8–8.8)
SODIUM SERPL-SCNC: 141 MMOL/L (ref 133–144)
TSH SERPL DL<=0.005 MIU/L-ACNC: 0.97 MU/L (ref 0.4–4)

## 2018-11-23 PROCEDURE — 84443 ASSAY THYROID STIM HORMONE: CPT | Performed by: INTERNAL MEDICINE

## 2018-11-23 PROCEDURE — 83036 HEMOGLOBIN GLYCOSYLATED A1C: CPT | Performed by: INTERNAL MEDICINE

## 2018-11-23 PROCEDURE — 80053 COMPREHEN METABOLIC PANEL: CPT | Performed by: INTERNAL MEDICINE

## 2018-11-23 PROCEDURE — 36415 COLL VENOUS BLD VENIPUNCTURE: CPT | Performed by: INTERNAL MEDICINE

## 2018-12-03 ENCOUNTER — OFFICE VISIT (OUTPATIENT)
Dept: INTERNAL MEDICINE | Facility: CLINIC | Age: 43
End: 2018-12-03
Payer: COMMERCIAL

## 2018-12-03 VITALS
TEMPERATURE: 97.6 F | RESPIRATION RATE: 16 BRPM | OXYGEN SATURATION: 99 % | DIASTOLIC BLOOD PRESSURE: 80 MMHG | HEART RATE: 70 BPM | SYSTOLIC BLOOD PRESSURE: 135 MMHG | BODY MASS INDEX: 39.7 KG/M2 | WEIGHT: 292.7 LBS

## 2018-12-03 DIAGNOSIS — E11.9 TYPE 2 DIABETES MELLITUS WITHOUT COMPLICATION, WITHOUT LONG-TERM CURRENT USE OF INSULIN (H): Primary | ICD-10-CM

## 2018-12-03 DIAGNOSIS — Z87.898 H/O MOTION SICKNESS: ICD-10-CM

## 2018-12-03 DIAGNOSIS — Z13.89 SCREENING FOR DIABETIC PERIPHERAL NEUROPATHY: ICD-10-CM

## 2018-12-03 DIAGNOSIS — H10.019: ICD-10-CM

## 2018-12-03 DIAGNOSIS — I10 ESSENTIAL HYPERTENSION, BENIGN: ICD-10-CM

## 2018-12-03 DIAGNOSIS — L30.9 ACUTE DERMATITIS: ICD-10-CM

## 2018-12-03 DIAGNOSIS — F40.243 FEAR OF FLYING: ICD-10-CM

## 2018-12-03 DIAGNOSIS — E78.2 MIXED HYPERLIPIDEMIA: ICD-10-CM

## 2018-12-03 PROCEDURE — 99207 C FOOT EXAM  NO CHARGE: CPT | Mod: 25 | Performed by: INTERNAL MEDICINE

## 2018-12-03 PROCEDURE — 99214 OFFICE O/P EST MOD 30 MIN: CPT | Performed by: INTERNAL MEDICINE

## 2018-12-03 RX ORDER — LOSARTAN POTASSIUM 100 MG/1
100 TABLET ORAL DAILY
Qty: 90 TABLET | Refills: 3 | Status: SHIPPED | OUTPATIENT
Start: 2018-12-03 | End: 2019-10-30

## 2018-12-03 RX ORDER — HYDROCHLOROTHIAZIDE 25 MG/1
12.5 TABLET ORAL DAILY
Qty: 45 TABLET | Refills: 3 | Status: SHIPPED | OUTPATIENT
Start: 2018-12-03 | End: 2019-10-30

## 2018-12-03 RX ORDER — GLIMEPIRIDE 2 MG/1
2 TABLET ORAL
Qty: 90 TABLET | Refills: 3 | Status: SHIPPED | OUTPATIENT
Start: 2018-12-03 | End: 2019-10-30

## 2018-12-03 RX ORDER — SCOLOPAMINE TRANSDERMAL SYSTEM 1 MG/1
PATCH, EXTENDED RELEASE TRANSDERMAL
Qty: 12 PATCH | Refills: 1 | Status: SHIPPED | OUTPATIENT
Start: 2018-12-03

## 2018-12-03 RX ORDER — TRIAMCINOLONE ACETONIDE 1 MG/G
CREAM TOPICAL
Qty: 80 G | Refills: 3 | Status: SHIPPED | OUTPATIENT
Start: 2018-12-03 | End: 2019-10-21

## 2018-12-03 RX ORDER — AMLODIPINE BESYLATE 10 MG/1
10 TABLET ORAL DAILY
Qty: 90 TABLET | Refills: 3 | Status: SHIPPED | OUTPATIENT
Start: 2018-12-03 | End: 2019-10-30

## 2018-12-03 RX ORDER — ALPRAZOLAM 0.5 MG
TABLET ORAL
Qty: 20 TABLET | Refills: 0 | Status: SHIPPED | OUTPATIENT
Start: 2018-12-03 | End: 2020-01-03

## 2018-12-03 RX ORDER — ATORVASTATIN CALCIUM 20 MG/1
20 TABLET, FILM COATED ORAL DAILY
Qty: 90 TABLET | Refills: 3 | Status: SHIPPED | OUTPATIENT
Start: 2018-12-03 | End: 2019-10-30

## 2018-12-03 RX ORDER — POLYMYXIN B SULFATE AND TRIMETHOPRIM 1; 10000 MG/ML; [USP'U]/ML
1 SOLUTION OPHTHALMIC
Qty: 10 ML | Refills: 0 | Status: SHIPPED | OUTPATIENT
Start: 2018-12-03 | End: 2019-10-30

## 2018-12-03 NOTE — LETTER
[facility]  [address] [id number] [phone]        July 8, 2019    Jason E Behary  [address] [address]              Dear Jason E Behary    This is to remind you that your fasting labs is due.    You may call our office at [phone] to schedule an appointment.    Please disregard this notice if you have already had your labs drawn or made an appointment.        Sincerely,        Evangelista Saldana MD

## 2018-12-03 NOTE — PROGRESS NOTES
SUBJECTIVE:   Jason E Behary is a 43 year old male who presents to clinic today for the following health issues:      Diabetes Follow-up      Patient is checking blood sugars: not at all    Diabetic concerns: None and other - swelling in left foot     Symptoms of hypoglycemia (low blood sugar): none     Paresthesias (numbness or burning in feet) or sores: No     Date of last diabetic eye exam: November 6, 2018    Diabetes Management Resources    Hyperlipidemia Follow-Up      Rate your low fat/cholesterol diet?: good    Taking statin?  Yes, has a pain in the left elbow area and when he bends it shoot up the arm    Other lipid medications/supplements?:  none    Hypertension Follow-up      Outpatient blood pressures are not being checked.    Low Salt Diet: low salt    BP Readings from Last 2 Encounters:   07/10/18 133/82   06/12/18 129/83     Hemoglobin A1C (%)   Date Value   11/23/2018 7.6 (H)   04/03/2018 8.3 (H)     LDL Cholesterol Calculated (mg/dL)   Date Value   04/03/2018 65   01/10/2017 66       Amount of exercise or physical activity: 2-3 days/week for an average of greater than 60 minutes    Problems taking medications regularly: No    Medication side effects: none    Diet: diabetic        Problem list and histories reviewed & adjusted, as indicated.  Additional history: as documented    Since last visit has made some improvements in therapeutic lifestyle, is eating healthier and trying to get more regular activity.  Glycosylated hemoglobin down to 7.6.    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    Reviewed and updated as needed this visit by clinical staff       Reviewed and updated as needed this visit by Provider         ROS:  Constitutional, HEENT, cardiovascular, pulmonary, GI, , musculoskeletal, neuro, skin, endocrine and psych systems are negative,  except as otherwise noted.    OBJECTIVE:     /80  Pulse 70  Temp 97.6  F (36.4  C) (Oral)  Resp 16  Wt 292 lb 11.2 oz (132.8 kg)  SpO2 99%  BMI 39.7 kg/m2  Body mass index is 39.7 kg/(m^2).  GENERAL: healthy, alert and no distress  NECK: no adenopathy, no asymmetry, masses, or scars and thyroid normal to palpation  RESP: lungs clear to auscultation - no rales, rhonchi or wheezes  CV: regular rate and rhythm, normal S1 S2, no S3 or S4, no murmur, click or rub, no peripheral edema and peripheral pulses strong  ABDOMEN: soft, nontender, no hepatosplenomegaly, no masses and bowel sounds normal  MS: no gross musculoskeletal defects noted, no edema    Diagnostic Test Results:  none     ASSESSMENT/PLAN:     1. Type 2 diabetes mellitus without complication, without long-term current use of insulin (H)  Reasonably well-controlled - patient still has opportunity to improved weight.  Continue medications as currently, re-check surveillance labs in another 6 months.  - glimepiride (AMARYL) 2 MG tablet; Take 1 tablet (2 mg) by mouth every morning (before breakfast)  Dispense: 90 tablet; Refill: 3  - metFORMIN (GLUCOPHAGE) 1000 MG tablet; Take 1 tablet (1,000 mg) by mouth 2 times daily (with meals)  Dispense: 180 tablet; Refill: 3  - Lipid panel reflex to direct LDL Fasting; Future  - Comprehensive metabolic panel; Future  - Hemoglobin A1c; Future  - TSH with free T4 reflex; Future  - Albumin Random Urine Quantitative with Creat Ratio; Future    2. Screening for diabetic peripheral neuropathy    - FOOT EXAM  NO CHARGE [24548.114]    3. Mixed hyperlipidemia    - atorvastatin (LIPITOR) 20 MG tablet; Take 1 tablet (20 mg) by mouth daily  Dispense: 90 tablet; Refill: 3    4. Essential hypertension, benign  Stable, well-controlled  - amLODIPine (NORVASC) 10 MG tablet; Take 1 tablet (10 mg) by mouth daily  Dispense: 90 tablet; Refill: 3  - losartan (COZAAR) 100 MG tablet; Take 1 tablet (100 mg) by mouth daily  Dispense: 90  tablet; Refill: 3  - hydrochlorothiazide (HYDRODIURIL) 25 MG tablet; Take 0.5 tablets (12.5 mg) by mouth daily  Dispense: 45 tablet; Refill: 3    5. Fear of flying    - ALPRAZolam (XANAX) 0.5 MG tablet; Daily, 30 minutes prior to travel by plane  Dispense: 20 tablet; Refill: 0    6. H/O motion sickness    - scopolamine (TRANSDERM) 1 MG/3DAYS 72 hr patch; Apply 1 patch to hairless area behind one ear at least 4 hours before travel.  Remove old patch and change every 3 days (72 hours).  Dispense: 12 patch; Refill: 1    7. Acute dermatitis    - triamcinolone (KENALOG) 0.1 % external cream; Apply sparingly to affected area three times daily as needed  Dispense: 80 g; Refill: 3    8. Acute follicular conjunctivitis, unspecified laterality    - trimethoprim-polymyxin b (POLYTRIM) 35221-5.1 UNIT/ML-% ophthalmic solution; Place 1 drop into both eyes every 3 hours  Dispense: 10 mL; Refill: 0        Evangelista Saldana MD  [facility]

## 2018-12-03 NOTE — MR AVS SNAPSHOT
After Visit Summary   12/3/2018    Jason E Behary    MRN: [id number]           Patient Information     Date Of Birth          1975        Visit Information        Provider Department      12/3/2018 4:15 PM Evangelista Saldana MD [facility]        Today's Diagnoses     Type 2 diabetes mellitus without complication, without long-term current use of insulin (H)    -  1    Screening for diabetic peripheral neuropathy        Mixed hyperlipidemia        Essential hypertension, benign        Fear of flying        H/O motion sickness        Acute dermatitis        Acute follicular conjunctivitis, unspecified laterality          Care Instructions    - Take ibuprofen, 600 mg three times daily (WITH FOOD), only for next 2-3 days.           Follow-ups after your visit        Follow-up notes from your care team     Return in about 6 months (around 6/3/2019) for Diabetes Check, with pre-visit fasting lab.      Who to contact     If you have questions or need follow up information about today's clinic visit or your schedule please contact [facility] directly at [phone].  Normal or non-critical lab and imaging results will be communicated to you by [employer], letter or phone within 4 business days after the clinic has received the results. If you do not hear from us within 7 days, please contact the clinic through [employer] or phone. If you have a critical or abnormal lab result, we will notify you by phone as soon as possible.  Submit refill requests through [employer] or call your pharmacy and they will forward the refill request to us. Please allow 3 business days for your refill to be completed.          Additional Information About Your Visit        [employer] Information     [employer] gives you secure access to your electronic health record. If you see a primary care provider, you can also send messages to your care team and make appointments. If you have  questions, please call your primary care clinic.  If you do not have a primary care provider, please call [phone] and they will assist you.        Care EveryWhere ID     This is your Care EveryWhere ID. This could be used by other organizations to access your [place] medical records  [id number]        Your Vitals Were     Pulse Temperature Respirations Pulse Oximetry BMI (Body Mass Index)       70 97.6  F (36.4  C) (Oral) 16 99% 39.7 kg/m2        Blood Pressure from Last 3 Encounters:   12/03/18 135/80   07/10/18 133/82   06/12/18 129/83    Weight from Last 3 Encounters:   12/03/18 292 lb 11.2 oz (132.8 kg)   04/12/18 299 lb (135.6 kg)   11/03/17 297 lb (134.7 kg)              We Performed the Following     FOOT EXAM  NO CHARGE [45788.114]          Today's Medication Changes          These changes are accurate as of 12/3/18 11:59 PM.  If you have any questions, ask your nurse or doctor.               These medicines have changed or have updated prescriptions.        Dose/Directions    atorvastatin 20 MG tablet   Commonly known as:  LIPITOR   This may have changed:  See the new instructions.   Used for:  Mixed hyperlipidemia   Changed by:  Evangelista Saldana MD        Dose:  20 mg   Take 1 tablet (20 mg) by mouth daily   Quantity:  90 tablet   Refills:  3            Where to get your medicines      These medications were sent to [place] [address] [place] [address] [address]     Phone:  [phone]     amLODIPine 10 MG tablet    atorvastatin 20 MG tablet    glimepiride 2 MG tablet    hydrochlorothiazide 25 MG tablet    losartan 100 MG tablet    metFORMIN 1000 MG tablet    scopolamine 1 MG/3DAYS 72 hr patch    triamcinolone 0.1 % external cream    trimethoprim-polymyxin b 09559-5.1 UNIT/ML-% ophthalmic solution         Some of these will need a paper prescription and others can be bought over the counter.  Ask your nurse if you have questions.      Bring a paper prescription for each of these medications     ALPRAZolam 0.5 MG tablet                Primary Care Provider Office Phone # Fax #    Evangelista Saldana [phone] [address] [address]        Equal Access to Services     THOMAS MALDONADO : Hadii shahzad ronquillo hadasho Soomaali, waaxda luqadaha, qaybta kaalmada adeegyada, waxyasir tim hayandre pereapinated brar. So [facility] [phone].    ATENCIÓN: Si habla español, tiene a pena disposición servicios gratuitos de asistencia lingüística. Llame al [phone].    We comply with applicable federal civil rights laws and [place] laws. We do not discriminate on the basis of race, color, national origin, age, disability, sex, sexual orientation, or gender identity.            Thank you!     Thank you for choosing [facility]  for your care. Our goal is always to provide you with excellent care. Hearing back from our patients is one way we can continue to improve our services. Please take a few minutes to complete the written survey that you may receive in the mail after your visit with us. Thank you!             Your Updated Medication List - Protect others around you: Learn how to safely use, store and throw away your medicines at www.disposemymeds.org.          This list is accurate as of 12/3/18 11:59 PM.  Always use your most recent med list.                   Brand Name Dispense Instructions for use Diagnosis    ALPRAZolam 0.5 MG tablet    XANAX    20 tablet    Daily, 30 minutes prior to travel by plane    Fear of flying       amLODIPine 10 MG tablet    NORVASC    90 tablet    Take 1 tablet (10 mg) by mouth daily    Essential hypertension, benign       atorvastatin 20 MG tablet    LIPITOR    90 tablet    Take 1 tablet (20 mg) by mouth daily    Mixed hyperlipidemia       glimepiride 2 MG tablet    AMARYL    90 tablet    Take 1 tablet (2 mg) by mouth every morning (before breakfast)    Type 2 diabetes  mellitus without complication, without long-term current use of insulin (H)       hydrochlorothiazide 25 MG tablet    HYDRODIURIL    45 tablet    Take 0.5 tablets (12.5 mg) by mouth daily    Essential hypertension, benign       losartan 100 MG tablet    COZAAR    90 tablet    Take 1 tablet (100 mg) by mouth daily    Essential hypertension, benign       metFORMIN 1000 MG tablet    GLUCOPHAGE    180 tablet    Take 1 tablet (1,000 mg) by mouth 2 times daily (with meals)    Type 2 diabetes mellitus without complication, without long-term current use of insulin (H)       scopolamine 1 MG/3DAYS 72 hr patch    TRANSDERM    12 patch    Apply 1 patch to hairless area behind one ear at least 4 hours before travel.  Remove old patch and change every 3 days (72 hours).    H/O motion sickness       triamcinolone 0.1 % external cream    KENALOG    80 g    Apply sparingly to affected area three times daily as needed    Acute dermatitis       trimethoprim-polymyxin b 74189-3.1 UNIT/ML-% ophthalmic solution    POLYTRIM    10 mL    Place 1 drop into both eyes every 3 hours    Acute follicular conjunctivitis, unspecified laterality

## 2019-01-08 ENCOUNTER — OFFICE VISIT (OUTPATIENT)
Dept: INTERNAL MEDICINE | Facility: CLINIC | Age: 44
End: 2019-01-08
Payer: COMMERCIAL

## 2019-01-08 VITALS
DIASTOLIC BLOOD PRESSURE: 80 MMHG | HEART RATE: 88 BPM | SYSTOLIC BLOOD PRESSURE: 130 MMHG | TEMPERATURE: 97.8 F | RESPIRATION RATE: 16 BRPM | WEIGHT: 299.3 LBS | HEIGHT: 72 IN | BODY MASS INDEX: 40.54 KG/M2

## 2019-01-08 DIAGNOSIS — M94.0 COSTOCHONDRITIS: ICD-10-CM

## 2019-01-08 DIAGNOSIS — R10.13 DYSPEPSIA: Primary | ICD-10-CM

## 2019-01-08 PROCEDURE — 99213 OFFICE O/P EST LOW 20 MIN: CPT | Performed by: PHYSICIAN ASSISTANT

## 2019-01-08 RX ORDER — OMEPRAZOLE 40 MG/1
40 CAPSULE, DELAYED RELEASE ORAL DAILY
Qty: 30 CAPSULE | Refills: 0 | Status: SHIPPED | OUTPATIENT
Start: 2019-01-08 | End: 2020-01-08

## 2019-01-08 ASSESSMENT — MIFFLIN-ST. JEOR: SCORE: 2295.62

## 2019-01-08 NOTE — PROGRESS NOTES
SUBJECTIVE:   Jason E Behary is a 43 year old male who presents to clinic today for the following health issues:      GERD/Heartburn  Onset: x2 days     Description:     Burning in chest: YES    Intensity: moderate    Progression of Symptoms: same    Accompanying Signs & Symptoms:  Does it feel like food gets stuck: no   Nausea: no   Vomiting (bloody?): no   Abdominal Pain: YES- discomfort   Black-Tarry stools: no :  Bloody stools: no     History:   Previous ulcers: no     Precipitating factors:   Caffeine use: YES- sometimes   Alcohol use: YES  NSAID/Aspirin use: YES- ibuprofen   Tobacco use: no   Worse with no particular food or drink.    Alleviating factors:  Na     Therapies Tried and outcome:none    Also with some chest wall pain.  Was taking down a lot of tho decorations this weekend. Twisting and lifting.       -------------------------------------    Problem list and histories reviewed & adjusted, as indicated.  Additional history: as documented    Labs reviewed in EPIC    Reviewed and updated as needed this visit by clinical staff  Tobacco  Allergies  Meds       Reviewed and updated as needed this visit by Provider  Allergies  Meds         ROS:  Constitutional, HEENT, cardiovascular, pulmonary, gi and gu systems are negative, except as otherwise noted.    OBJECTIVE:     /80 (BP Location: Left arm, Patient Position: Chair, Cuff Size: Adult Regular)   Pulse 88   Temp 97.8  F (36.6  C) (Oral)   Resp 16   Ht 1.829 m (6')   Wt 135.8 kg (299 lb 4.8 oz)   BMI 40.59 kg/m    Body mass index is 40.59 kg/m .  GENERAL: healthy, alert and no distress  NECK: no adenopathy, no asymmetry, masses, or scars and thyroid normal to palpation  RESP: lungs clear to auscultation - no rales, rhonchi or wheezes  CV: regular rates and rhythm and normal S1 S2, no S3 or S4  ABDOMEN: soft, nontender, no hepatosplenomegaly, no masses and bowel sounds normal  MS: left lower rib cage pain and left sternal border  pain with palpation     Diagnostic Test Results:  none     ASSESSMENT/PLAN:             1. Dyspepsia    - omeprazole (PRILOSEC) 40 MG DR capsule; Take 1 capsule (40 mg) by mouth daily  Dispense: 30 capsule; Refill: 0    2. Costochondritis        Limit spicy, fatty, caffeine in diet  PPI as above  Treatment of costochondritis reviewed.      See Patient Instructions    Farheen Mckeon PA-C  [facility]

## 2019-01-08 NOTE — PATIENT INSTRUCTIONS
Patient Education     Costochondritis    Costochondritis is inflammation of a rib or the cartilage that connects a rib to your breastbone (sternum). It causes tenderness, and sometimes chest pain may be sharp or aching, or it may feel like pressure. Pain may get worse with deep breathing, movement, or exercise. In some cases, the pain is mistaken for a heart attack. Despite this, the condition is not serious. Read on to learn more about the condition and how it can be treated.  What causes costochondritis?  The cause of costochondritis is not completely clear, but it may happen after a chest injury, chest infection, or coughing episode. Some physical activities can sometimes lead to costochondritis. Large-breasted women may be more likely to have the condition. Often, the reason for the inflammation is unknown.  Diagnosing costochondritis  There is no test for costochondritis. The condition is diagnosed by the symptoms you have. Your healthcare provider will perform a physical exam. He or she will ask you about your symptoms and examine your chest for tenderness. In some cases, tests are done to rule out more serious problems. These tests may include imaging tests such as chest X-ray, CT scan, or an ECG.  Treating costochondritis  If an underlying cause is found, treatment for that will likely relieve the problem. Costochondritis often goes away on its own. The course of the condition varies from person to person. It usually lasts from weeks to months. In some cases, mild symptoms continue for months to years. To ease symptoms:    Take medicine as directed. These relieve pain and swelling. Ibuprofen or other NSAIDs are often recommended. In some cases, you may be given prescription medicine, such as muscle relaxants.    Avoid activities that put stress on the chest or spine.    Apply a heating pad (set to warm, not too high, heat) to the breastbone several times a day.    Perform stretching exercises as  directed.  Call the healthcare provider right away if you have any of the following:    Pain that is not relieved by medicine    Shortness of breath    Lightheadedness, dizziness, or fainting    Feeling of irregular heartbeat or fast pulse  Anyone with chest pain should see a healthcare provider, especially those who are older and may be at risk for heart disease.   Date Last Reviewed: 10/1/2016    8046-4836 [employer]. [address] [place] [address], [place] [id number]. All rights reserved. This information is not intended as a substitute for professional medical care. Always follow your healthcare professional's instructions.

## 2019-06-10 ENCOUNTER — MYC MEDICAL ADVICE (OUTPATIENT)
Dept: INTERNAL MEDICINE | Facility: CLINIC | Age: 44
End: 2019-06-10

## 2019-06-11 ENCOUNTER — MYC MEDICAL ADVICE (OUTPATIENT)
Dept: INTERNAL MEDICINE | Facility: CLINIC | Age: 44
End: 2019-06-11

## 2019-06-11 NOTE — LETTER
[facility]   [address] [address]   Tel. [phone]   Fax [phone]           2019       To whom it may concern:       I am writing this letter on behalf of my patient, Jason E Behary (: 1975).         This patient has a history of low back pain with occasional lower extremity radiculopathy, exacerbated by prolonged sitting.  I recommend that he be provided with a sit-stand desk at work, as well as an ergonomic desk chair.         Please contact my office if you have questions or concerns.       Sincerely,               DANIELA Saldana   Department of Internal Medicine   [facility]

## 2019-10-05 ENCOUNTER — HEALTH MAINTENANCE LETTER (OUTPATIENT)
Age: 44
End: 2019-10-05

## 2019-10-12 DIAGNOSIS — E11.9 TYPE 2 DIABETES MELLITUS WITHOUT COMPLICATION, WITHOUT LONG-TERM CURRENT USE OF INSULIN (H): ICD-10-CM

## 2019-10-12 LAB
ALBUMIN SERPL-MCNC: 4.1 G/DL (ref 3.4–5)
ALP SERPL-CCNC: 88 U/L (ref 40–150)
ALT SERPL W P-5'-P-CCNC: 56 U/L (ref 0–70)
ANION GAP SERPL CALCULATED.3IONS-SCNC: 7 MMOL/L (ref 3–14)
AST SERPL W P-5'-P-CCNC: 25 U/L (ref 0–45)
BILIRUB SERPL-MCNC: 0.8 MG/DL (ref 0.2–1.3)
BUN SERPL-MCNC: 11 MG/DL (ref 7–30)
CALCIUM SERPL-MCNC: 9 MG/DL (ref 8.5–10.1)
CHLORIDE SERPL-SCNC: 105 MMOL/L (ref 94–109)
CHOLEST SERPL-MCNC: 123 MG/DL
CO2 SERPL-SCNC: 27 MMOL/L (ref 20–32)
CREAT SERPL-MCNC: 0.76 MG/DL (ref 0.66–1.25)
CREAT UR-MCNC: 43 MG/DL
GFR SERPL CREATININE-BSD FRML MDRD: >90 ML/MIN/{1.73_M2}
GLUCOSE SERPL-MCNC: 205 MG/DL (ref 70–99)
HBA1C MFR BLD: 8.1 % (ref 0–5.6)
HDLC SERPL-MCNC: 36 MG/DL
LDLC SERPL CALC-MCNC: 48 MG/DL
MICROALBUMIN UR-MCNC: 7 MG/L
MICROALBUMIN/CREAT UR: 16.24 MG/G CR (ref 0–17)
NONHDLC SERPL-MCNC: 87 MG/DL
POTASSIUM SERPL-SCNC: 4.1 MMOL/L (ref 3.4–5.3)
PROT SERPL-MCNC: 7.3 G/DL (ref 6.8–8.8)
SODIUM SERPL-SCNC: 139 MMOL/L (ref 133–144)
TRIGL SERPL-MCNC: 193 MG/DL
TSH SERPL DL<=0.005 MIU/L-ACNC: 1.26 MU/L (ref 0.4–4)

## 2019-10-12 PROCEDURE — 80053 COMPREHEN METABOLIC PANEL: CPT | Performed by: INTERNAL MEDICINE

## 2019-10-12 PROCEDURE — 80061 LIPID PANEL: CPT | Performed by: INTERNAL MEDICINE

## 2019-10-12 PROCEDURE — 83036 HEMOGLOBIN GLYCOSYLATED A1C: CPT | Performed by: INTERNAL MEDICINE

## 2019-10-12 PROCEDURE — 84443 ASSAY THYROID STIM HORMONE: CPT | Performed by: INTERNAL MEDICINE

## 2019-10-12 PROCEDURE — 82043 UR ALBUMIN QUANTITATIVE: CPT | Performed by: INTERNAL MEDICINE

## 2019-10-12 PROCEDURE — 36415 COLL VENOUS BLD VENIPUNCTURE: CPT | Performed by: INTERNAL MEDICINE

## 2019-10-21 ENCOUNTER — MYC MEDICAL ADVICE (OUTPATIENT)
Dept: INTERNAL MEDICINE | Facility: CLINIC | Age: 44
End: 2019-10-21

## 2019-10-21 DIAGNOSIS — L30.9 ACUTE DERMATITIS: ICD-10-CM

## 2019-10-21 RX ORDER — TRIAMCINOLONE ACETONIDE 1 MG/G
CREAM TOPICAL
Qty: 80 G | Refills: 3 | Status: SHIPPED | OUTPATIENT
Start: 2019-10-21 | End: 2019-10-30

## 2019-10-30 ENCOUNTER — OFFICE VISIT (OUTPATIENT)
Dept: INTERNAL MEDICINE | Facility: CLINIC | Age: 44
End: 2019-10-30
Payer: COMMERCIAL

## 2019-10-30 VITALS
WEIGHT: 285.6 LBS | TEMPERATURE: 97.6 F | DIASTOLIC BLOOD PRESSURE: 80 MMHG | OXYGEN SATURATION: 99 % | BODY MASS INDEX: 38.73 KG/M2 | RESPIRATION RATE: 16 BRPM | SYSTOLIC BLOOD PRESSURE: 120 MMHG | HEART RATE: 76 BPM

## 2019-10-30 DIAGNOSIS — E78.2 MIXED HYPERLIPIDEMIA: ICD-10-CM

## 2019-10-30 DIAGNOSIS — B07.0 PLANTAR WARTS: ICD-10-CM

## 2019-10-30 DIAGNOSIS — H10.019: ICD-10-CM

## 2019-10-30 DIAGNOSIS — L30.9 ACUTE DERMATITIS: ICD-10-CM

## 2019-10-30 DIAGNOSIS — E11.9 TYPE 2 DIABETES MELLITUS WITHOUT COMPLICATION, WITHOUT LONG-TERM CURRENT USE OF INSULIN (H): Primary | ICD-10-CM

## 2019-10-30 DIAGNOSIS — I10 ESSENTIAL HYPERTENSION, BENIGN: ICD-10-CM

## 2019-10-30 PROCEDURE — 99214 OFFICE O/P EST MOD 30 MIN: CPT | Performed by: INTERNAL MEDICINE

## 2019-10-30 RX ORDER — ATORVASTATIN CALCIUM 20 MG/1
20 TABLET, FILM COATED ORAL DAILY
Qty: 90 TABLET | Refills: 3 | Status: SHIPPED | OUTPATIENT
Start: 2019-10-30

## 2019-10-30 RX ORDER — TRIAMCINOLONE ACETONIDE 1 MG/G
CREAM TOPICAL
Qty: 80 G | Refills: 3 | Status: SHIPPED | OUTPATIENT
Start: 2019-10-30

## 2019-10-30 RX ORDER — POLYMYXIN B SULFATE AND TRIMETHOPRIM 1; 10000 MG/ML; [USP'U]/ML
1 SOLUTION OPHTHALMIC
Qty: 10 ML | Refills: 3 | Status: SHIPPED | OUTPATIENT
Start: 2019-10-30

## 2019-10-30 RX ORDER — AMLODIPINE BESYLATE 10 MG/1
10 TABLET ORAL DAILY
Qty: 90 TABLET | Refills: 3 | Status: SHIPPED | OUTPATIENT
Start: 2019-10-30 | End: 2020-10-14

## 2019-10-30 RX ORDER — LOSARTAN POTASSIUM 100 MG/1
100 TABLET ORAL DAILY
Qty: 90 TABLET | Refills: 3 | Status: SHIPPED | OUTPATIENT
Start: 2019-10-30

## 2019-10-30 RX ORDER — GLIMEPIRIDE 2 MG/1
2 TABLET ORAL
Qty: 90 TABLET | Refills: 3 | Status: SHIPPED | OUTPATIENT
Start: 2019-10-30 | End: 2021-01-06

## 2019-10-30 RX ORDER — HYDROCHLOROTHIAZIDE 25 MG/1
12.5 TABLET ORAL DAILY
Qty: 45 TABLET | Refills: 3 | Status: SHIPPED | OUTPATIENT
Start: 2019-10-30

## 2019-10-30 NOTE — PATIENT INSTRUCTIONS
- Continue all medications as you have been.   - Keep working towards healthy diet, losing weight    - Please follow-up with me in clinic in 6 months.  - Please come in for fasting labs, a few days prior to the appointment with me.  You may schedule appointments at our , through [employer], or by calling [phone].

## 2019-10-30 NOTE — PROGRESS NOTES
"Subjective     Jason E Behary is a 44 year old male who presents to clinic today for the following health issues:    HPI   Hyperlipidemia Follow-Up      Are you having any of the following symptoms? (Select all that apply)  No complaints of shortness of breath, chest pain or pressure.  No increased sweating or nausea with activity.  No left-sided neck or arm pain.  No complaints of pain in calves when walking 1-2 blocks.    Are you regularly taking any medication or supplement to lower your cholesterol?   Yes- atorvastatin    Are you having muscle aches or other side effects that you think could be caused by your cholesterol lowering medication?  Yes- Has been having some sciatica issues and some right thigh aches    Hypertension Follow-up      Do you check your blood pressure regularly outside of the clinic? No     Are you following a low salt diet? Yes    Are your blood pressures ever more than 140 on the top number (systolic) OR more   than 90 on the bottom number (diastolic), for example 140/90? No      How many servings of fruits and vegetables do you eat daily?  2-3    On average, how many sweetened beverages do you drink each day (soda, juice, sweet tea, etc)?   1    How many days per week do you miss taking your medication? 1 forget sometimes        Patient has significant improvement that he can make with his diet, he has been \"eating junk\" lately.    Patient has a painful \"scab\" on his left heel that is not exactly visible to him.  No obvious injury or inciting event.      Reviewed and updated as needed this visit by Provider  Tobacco  Allergies  Meds  Problems  Med Hx  Surg Hx  Fam Hx         Review of Systems   ROS COMP: Constitutional, HEENT, cardiovascular, pulmonary, GI, , musculoskeletal, neuro, skin, endocrine and psych systems are negative, except as otherwise noted.      Objective    /80 (BP Location: Left arm, Patient Position: Chair, Cuff Size: Adult Large)   Pulse 76   Temp " 97.6  F (36.4  C) (Oral)   Resp 16   Wt 129.5 kg (285 lb 9.6 oz)   SpO2 99%   BMI 38.73 kg/m    Body mass index is 38.73 kg/m .  Physical Exam   GENERAL: healthy, alert and no distress  NECK: no adenopathy, no asymmetry, masses, or scars and thyroid normal to palpation  RESP: lungs clear to auscultation - no rales, rhonchi or wheezes  CV: regular rate and rhythm, normal S1 S2, no S3 or S4, no murmur, click or rub, no peripheral edema and peripheral pulses strong  ABDOMEN: soft, nontender, no hepatosplenomegaly, no masses and bowel sounds normal  MS: no gross musculoskeletal defects noted, no edema  SKIN: Plantar wart on left lateral heel            Assessment & Plan     1. Type 2 diabetes mellitus without complication, without long-term current use of insulin (H)  Patient wishes to focus on therapeutic lifestyle changes, rather than augment his medical therapy.  I think this is reasonable, will recheck A1c in another 6 months.  When discussing various additional medical options, he is strongly against the concept of any injectable medication.  - glimepiride (AMARYL) 2 MG tablet; Take 1 tablet (2 mg) by mouth every morning (before breakfast)  Dispense: 90 tablet; Refill: 3  - metFORMIN (GLUCOPHAGE) 1000 MG tablet; Take 1 tablet (1,000 mg) by mouth 2 times daily (with meals)  Dispense: 180 tablet; Refill: 3  - Hemoglobin A1c; Future  - Basic metabolic panel; Future    2. Essential hypertension, benign  Reasonably well controlled  - amLODIPine (NORVASC) 10 MG tablet; Take 1 tablet (10 mg) by mouth daily  Dispense: 90 tablet; Refill: 3  - hydrochlorothiazide (HYDRODIURIL) 25 MG tablet; Take 0.5 tablets (12.5 mg) by mouth daily  Dispense: 45 tablet; Refill: 3  - losartan (COZAAR) 100 MG tablet; Take 1 tablet (100 mg) by mouth daily  Dispense: 90 tablet; Refill: 3    3. Mixed hyperlipidemia  Stable  - atorvastatin (LIPITOR) 20 MG tablet; Take 1 tablet (20 mg) by mouth daily  Dispense: 90 tablet; Refill: 3    4. Acute  dermatitis    - triamcinolone (KENALOG) 0.1 % external cream; Apply sparingly to affected area three times daily as needed  Dispense: 80 g; Refill: 3    5. Acute follicular conjunctivitis, unspecified laterality    - trimethoprim-polymyxin b (POLYTRIM) 01526-8.1 UNIT/ML-% ophthalmic solution; Place 1 drop into both eyes every 3 hours  Dispense: 10 mL; Refill: 3    6. Plantar warts  Treated with liquid nitrogen.             Return in about 6 months (around 4/30/2020) for Diabetes Check, with pre-visit fasting lab.    Evangelista Saldana MD  [facility]

## 2019-11-05 ENCOUNTER — TRANSFERRED RECORDS (OUTPATIENT)
Dept: HEALTH INFORMATION MANAGEMENT | Facility: CLINIC | Age: 44
End: 2019-11-05

## 2020-01-02 ENCOUNTER — MYC MEDICAL ADVICE (OUTPATIENT)
Dept: INTERNAL MEDICINE | Facility: CLINIC | Age: 45
End: 2020-01-02

## 2020-01-02 DIAGNOSIS — F40.243 FEAR OF FLYING: ICD-10-CM

## 2020-01-03 RX ORDER — ALPRAZOLAM 0.5 MG
TABLET ORAL
Qty: 20 TABLET | Refills: 0 | Status: SHIPPED | OUTPATIENT
Start: 2020-01-03

## 2020-01-03 NOTE — TELEPHONE ENCOUNTER
Requested Prescriptions   Pending Prescriptions Disp Refills     ALPRAZolam (XANAX) 0.5 MG tablet 20 tablet 0     Sig: Daily, 30 minutes prior to travel by plane       There is no refill protocol information for this order      Alprazolam  Last Written Prescription Date:  12/2/18  Last Fill Quantity: 20,   # refills: 0  Last Office Visit: 10-30-19  Future Office visit:       Routing refill request to provider for review/approval because:  Drug not on the [facility], P or [facility] refill protocol or controlled substance

## 2020-10-12 DIAGNOSIS — I10 ESSENTIAL HYPERTENSION, BENIGN: ICD-10-CM

## 2020-10-14 RX ORDER — AMLODIPINE BESYLATE 10 MG/1
10 TABLET ORAL DAILY
Qty: 90 TABLET | Refills: 3 | Status: SHIPPED | OUTPATIENT
Start: 2020-10-14 | End: 2021-01-06

## 2020-11-14 ENCOUNTER — HEALTH MAINTENANCE LETTER (OUTPATIENT)
Age: 45
End: 2020-11-14

## 2021-01-05 DIAGNOSIS — E11.9 TYPE 2 DIABETES MELLITUS WITHOUT COMPLICATION, WITHOUT LONG-TERM CURRENT USE OF INSULIN (H): ICD-10-CM

## 2021-01-05 DIAGNOSIS — I10 ESSENTIAL HYPERTENSION, BENIGN: ICD-10-CM

## 2021-01-05 NOTE — TELEPHONE ENCOUNTER
Amlodipine and Amaryl  Routing refill request to provider for review/approval because:  Due for labs and visit

## 2021-01-06 RX ORDER — GLIMEPIRIDE 2 MG/1
2 TABLET ORAL
Qty: 90 TABLET | Refills: 0 | Status: SHIPPED | OUTPATIENT
Start: 2021-01-06

## 2021-01-06 RX ORDER — AMLODIPINE BESYLATE 10 MG/1
10 TABLET ORAL DAILY
Qty: 90 TABLET | Refills: 0 | Status: SHIPPED | OUTPATIENT
Start: 2021-01-06

## 2021-01-06 NOTE — TELEPHONE ENCOUNTER
Approved without refills.  Patient needs follow-up appointment in next few months, with fasting labs a few days prior.  Future orders placed.  Appt with me can be virtual.

## 2021-01-06 NOTE — TELEPHONE ENCOUNTER
Routing refill request to provider for review/approval because:  Labs not current:  BMP  Patient needs to be seen because:  Due for preventative visit  CR not at goal    Last office visit: 10/30/2019  Future Office Visit:

## 2021-01-12 ENCOUNTER — MYC MEDICAL ADVICE (OUTPATIENT)
Dept: INTERNAL MEDICINE | Facility: CLINIC | Age: 46
End: 2021-01-12

## 2021-05-23 ENCOUNTER — HEALTH MAINTENANCE LETTER (OUTPATIENT)
Age: 46
End: 2021-05-23

## 2021-09-12 ENCOUNTER — HEALTH MAINTENANCE LETTER (OUTPATIENT)
Age: 46
End: 2021-09-12

## 2022-01-02 ENCOUNTER — HEALTH MAINTENANCE LETTER (OUTPATIENT)
Age: 47
End: 2022-01-02

## 2022-08-14 ENCOUNTER — HEALTH MAINTENANCE LETTER (OUTPATIENT)
Age: 47
End: 2022-08-14

## 2022-11-19 ENCOUNTER — HEALTH MAINTENANCE LETTER (OUTPATIENT)
Age: 47
End: 2022-11-19

## 2023-04-09 ENCOUNTER — HEALTH MAINTENANCE LETTER (OUTPATIENT)
Age: 48
End: 2023-04-09

## 2023-11-18 ENCOUNTER — HEALTH MAINTENANCE LETTER (OUTPATIENT)
Age: 48
End: 2023-11-18